# Patient Record
Sex: FEMALE | Race: WHITE | NOT HISPANIC OR LATINO | URBAN - METROPOLITAN AREA
[De-identification: names, ages, dates, MRNs, and addresses within clinical notes are randomized per-mention and may not be internally consistent; named-entity substitution may affect disease eponyms.]

---

## 2017-03-13 ENCOUNTER — OUTPATIENT (OUTPATIENT)
Dept: OUTPATIENT SERVICES | Facility: HOSPITAL | Age: 82
LOS: 1 days | Discharge: HOME | End: 2017-03-13

## 2017-06-27 DIAGNOSIS — E11.9 TYPE 2 DIABETES MELLITUS WITHOUT COMPLICATIONS: ICD-10-CM

## 2017-06-27 DIAGNOSIS — E78.00 PURE HYPERCHOLESTEROLEMIA, UNSPECIFIED: ICD-10-CM

## 2017-07-10 ENCOUNTER — OUTPATIENT (OUTPATIENT)
Dept: OUTPATIENT SERVICES | Facility: HOSPITAL | Age: 82
LOS: 1 days | Discharge: HOME | End: 2017-07-10

## 2017-07-10 DIAGNOSIS — E78.00 PURE HYPERCHOLESTEROLEMIA, UNSPECIFIED: ICD-10-CM

## 2017-07-10 DIAGNOSIS — E11.9 TYPE 2 DIABETES MELLITUS WITHOUT COMPLICATIONS: ICD-10-CM

## 2017-07-10 DIAGNOSIS — R74.9 ABNORMAL SERUM ENZYME LEVEL, UNSPECIFIED: ICD-10-CM

## 2017-11-06 ENCOUNTER — OUTPATIENT (OUTPATIENT)
Dept: OUTPATIENT SERVICES | Facility: HOSPITAL | Age: 82
LOS: 1 days | Discharge: HOME | End: 2017-11-06

## 2017-11-06 DIAGNOSIS — D51.0 VITAMIN B12 DEFICIENCY ANEMIA DUE TO INTRINSIC FACTOR DEFICIENCY: ICD-10-CM

## 2017-11-06 DIAGNOSIS — E78.00 PURE HYPERCHOLESTEROLEMIA, UNSPECIFIED: ICD-10-CM

## 2017-11-06 DIAGNOSIS — I10 ESSENTIAL (PRIMARY) HYPERTENSION: ICD-10-CM

## 2017-11-06 DIAGNOSIS — E11.9 TYPE 2 DIABETES MELLITUS WITHOUT COMPLICATIONS: ICD-10-CM

## 2017-11-20 ENCOUNTER — OUTPATIENT (OUTPATIENT)
Dept: OUTPATIENT SERVICES | Facility: HOSPITAL | Age: 82
LOS: 1 days | Discharge: HOME | End: 2017-11-20

## 2017-11-20 DIAGNOSIS — N63.20 UNSPECIFIED LUMP IN THE LEFT BREAST, UNSPECIFIED QUADRANT: ICD-10-CM

## 2017-11-20 DIAGNOSIS — Z85.3 PERSONAL HISTORY OF MALIGNANT NEOPLASM OF BREAST: ICD-10-CM

## 2017-11-27 ENCOUNTER — OUTPATIENT (OUTPATIENT)
Dept: OUTPATIENT SERVICES | Facility: HOSPITAL | Age: 82
LOS: 1 days | Discharge: HOME | End: 2017-11-27

## 2017-11-29 PROBLEM — Z00.00 ENCOUNTER FOR PREVENTIVE HEALTH EXAMINATION: Status: ACTIVE | Noted: 2017-11-29

## 2017-12-04 ENCOUNTER — APPOINTMENT (OUTPATIENT)
Dept: BREAST CENTER | Facility: CLINIC | Age: 82
End: 2017-12-04
Payer: MEDICARE

## 2017-12-04 ENCOUNTER — OUTPATIENT (OUTPATIENT)
Dept: OUTPATIENT SERVICES | Facility: HOSPITAL | Age: 82
LOS: 1 days | Discharge: HOME | End: 2017-12-04

## 2017-12-04 VITALS
BODY MASS INDEX: 30.73 KG/M2 | DIASTOLIC BLOOD PRESSURE: 90 MMHG | HEART RATE: 81 BPM | WEIGHT: 180 LBS | SYSTOLIC BLOOD PRESSURE: 140 MMHG | HEIGHT: 64 IN | OXYGEN SATURATION: 93 %

## 2017-12-04 DIAGNOSIS — I74.4 EMBOLISM AND THROMBOSIS OF ARTERIES OF EXTREMITIES, UNSPECIFIED: ICD-10-CM

## 2017-12-04 DIAGNOSIS — Z85.3 PERSONAL HISTORY OF MALIGNANT NEOPLASM OF BREAST: ICD-10-CM

## 2017-12-04 DIAGNOSIS — Z86.39 PERSONAL HISTORY OF OTHER ENDOCRINE, NUTRITIONAL AND METABOLIC DISEASE: ICD-10-CM

## 2017-12-04 DIAGNOSIS — Z86.79 PERSONAL HISTORY OF OTHER DISEASES OF THE CIRCULATORY SYSTEM: ICD-10-CM

## 2017-12-04 PROCEDURE — 99203 OFFICE O/P NEW LOW 30 MIN: CPT

## 2017-12-05 PROBLEM — Z86.79 HISTORY OF HYPERTENSION: Status: RESOLVED | Noted: 2017-12-05 | Resolved: 2017-12-05

## 2017-12-05 PROBLEM — Z86.39 HISTORY OF DIABETES MELLITUS: Status: RESOLVED | Noted: 2017-12-05 | Resolved: 2017-12-05

## 2017-12-05 PROBLEM — Z85.3 HISTORY OF MALIGNANT NEOPLASM OF RIGHT BREAST: Status: RESOLVED | Noted: 2017-12-05 | Resolved: 2017-12-05

## 2017-12-05 RX ORDER — ASPIRIN 325 MG/1
TABLET, FILM COATED ORAL
Refills: 0 | Status: ACTIVE | COMMUNITY

## 2017-12-05 RX ORDER — LISINOPRIL 30 MG/1
TABLET ORAL
Refills: 0 | Status: ACTIVE | COMMUNITY

## 2017-12-05 RX ORDER — ATORVASTATIN CALCIUM 80 MG/1
TABLET, FILM COATED ORAL
Refills: 0 | Status: ACTIVE | COMMUNITY

## 2017-12-05 RX ORDER — VITAMIN E 268 MG
CAPSULE ORAL
Refills: 0 | Status: ACTIVE | COMMUNITY

## 2017-12-06 DIAGNOSIS — N63.10 UNSPECIFIED LUMP IN THE RIGHT BREAST, UNSPECIFIED QUADRANT: ICD-10-CM

## 2017-12-06 DIAGNOSIS — D24.2 BENIGN NEOPLASM OF LEFT BREAST: ICD-10-CM

## 2017-12-06 DIAGNOSIS — C50.912 MALIGNANT NEOPLASM OF UNSPECIFIED SITE OF LEFT FEMALE BREAST: ICD-10-CM

## 2017-12-06 DIAGNOSIS — D05.12 INTRADUCTAL CARCINOMA IN SITU OF LEFT BREAST: ICD-10-CM

## 2017-12-06 DIAGNOSIS — Z17.0 ESTROGEN RECEPTOR POSITIVE STATUS [ER+]: ICD-10-CM

## 2017-12-12 LAB
ANION GAP SERPL CALC-SCNC: 11 MEQ/L
BUN SERPL-MCNC: 18 MG/DL
BUN/CREAT SERPL: 13.7 %
CALCIUM SERPL-MCNC: 10.4 MG/DL
CHLORIDE SERPL-SCNC: 102 MEQ/L
CO2 SERPL-SCNC: 26 MEQ/L
CREAT SERPL-MCNC: 1.31 MG/DL
GFR SERPL CREATININE-BSD FRML MDRD: 39
GLUCOSE SERPL-MCNC: 86 MG/DL
POTASSIUM SERPL-SCNC: 4.6 MMOL/L
SODIUM SERPL-SCNC: 139 MEQ/L

## 2017-12-14 ENCOUNTER — OUTPATIENT (OUTPATIENT)
Dept: OUTPATIENT SERVICES | Facility: HOSPITAL | Age: 82
LOS: 1 days | Discharge: HOME | End: 2017-12-14

## 2017-12-14 DIAGNOSIS — C50.412 MALIGNANT NEOPLASM OF UPPER-OUTER QUADRANT OF LEFT FEMALE BREAST: ICD-10-CM

## 2017-12-18 ENCOUNTER — MOBILE ON CALL (OUTPATIENT)
Age: 82
End: 2017-12-18

## 2017-12-22 ENCOUNTER — APPOINTMENT (OUTPATIENT)
Dept: BREAST CENTER | Facility: CLINIC | Age: 82
End: 2017-12-22
Payer: MEDICARE

## 2017-12-22 PROCEDURE — 99213 OFFICE O/P EST LOW 20 MIN: CPT

## 2018-01-05 ENCOUNTER — OUTPATIENT (OUTPATIENT)
Dept: OUTPATIENT SERVICES | Facility: HOSPITAL | Age: 83
LOS: 1 days | Discharge: HOME | End: 2018-01-05

## 2018-01-05 DIAGNOSIS — E11.9 TYPE 2 DIABETES MELLITUS WITHOUT COMPLICATIONS: ICD-10-CM

## 2018-01-05 DIAGNOSIS — Z01.818 ENCOUNTER FOR OTHER PREPROCEDURAL EXAMINATION: ICD-10-CM

## 2018-01-05 DIAGNOSIS — C50.919 MALIGNANT NEOPLASM OF UNSPECIFIED SITE OF UNSPECIFIED FEMALE BREAST: ICD-10-CM

## 2018-01-05 DIAGNOSIS — M19.90 UNSPECIFIED OSTEOARTHRITIS, UNSPECIFIED SITE: ICD-10-CM

## 2018-01-08 DIAGNOSIS — Z85.3 PERSONAL HISTORY OF MALIGNANT NEOPLASM OF BREAST: ICD-10-CM

## 2018-01-08 DIAGNOSIS — I10 ESSENTIAL (PRIMARY) HYPERTENSION: ICD-10-CM

## 2018-01-17 ENCOUNTER — OUTPATIENT (OUTPATIENT)
Dept: OUTPATIENT SERVICES | Facility: HOSPITAL | Age: 83
LOS: 1 days | Discharge: HOME | End: 2018-01-17

## 2018-01-17 DIAGNOSIS — C50.919 MALIGNANT NEOPLASM OF UNSPECIFIED SITE OF UNSPECIFIED FEMALE BREAST: ICD-10-CM

## 2018-01-17 DIAGNOSIS — E11.9 TYPE 2 DIABETES MELLITUS WITHOUT COMPLICATIONS: ICD-10-CM

## 2018-01-17 DIAGNOSIS — M19.90 UNSPECIFIED OSTEOARTHRITIS, UNSPECIFIED SITE: ICD-10-CM

## 2018-01-18 ENCOUNTER — APPOINTMENT (OUTPATIENT)
Dept: BREAST CENTER | Facility: HOSPITAL | Age: 83
End: 2018-01-18
Payer: MEDICARE

## 2018-01-18 ENCOUNTER — INPATIENT (INPATIENT)
Facility: HOSPITAL | Age: 83
LOS: 0 days | Discharge: HOME HEALTH | End: 2018-01-19
Attending: SURGERY

## 2018-01-18 DIAGNOSIS — C50.919 MALIGNANT NEOPLASM OF UNSPECIFIED SITE OF UNSPECIFIED FEMALE BREAST: ICD-10-CM

## 2018-01-18 DIAGNOSIS — E11.9 TYPE 2 DIABETES MELLITUS WITHOUT COMPLICATIONS: ICD-10-CM

## 2018-01-18 DIAGNOSIS — M19.90 UNSPECIFIED OSTEOARTHRITIS, UNSPECIFIED SITE: ICD-10-CM

## 2018-01-18 PROCEDURE — 19303 MAST SIMPLE COMPLETE: CPT | Mod: LT

## 2018-01-18 PROCEDURE — 38792 RA TRACER ID OF SENTINL NODE: CPT | Mod: LT

## 2018-01-18 PROCEDURE — 38500 BIOPSY/REMOVAL LYMPH NODES: CPT | Mod: LT

## 2018-01-23 DIAGNOSIS — Z17.0 ESTROGEN RECEPTOR POSITIVE STATUS [ER+]: ICD-10-CM

## 2018-01-23 DIAGNOSIS — M19.90 UNSPECIFIED OSTEOARTHRITIS, UNSPECIFIED SITE: ICD-10-CM

## 2018-01-23 DIAGNOSIS — E11.9 TYPE 2 DIABETES MELLITUS WITHOUT COMPLICATIONS: ICD-10-CM

## 2018-01-23 DIAGNOSIS — C50.412 MALIGNANT NEOPLASM OF UPPER-OUTER QUADRANT OF LEFT FEMALE BREAST: ICD-10-CM

## 2018-01-23 DIAGNOSIS — E78.00 PURE HYPERCHOLESTEROLEMIA, UNSPECIFIED: ICD-10-CM

## 2018-01-23 DIAGNOSIS — I10 ESSENTIAL (PRIMARY) HYPERTENSION: ICD-10-CM

## 2018-01-26 ENCOUNTER — APPOINTMENT (OUTPATIENT)
Dept: BREAST CENTER | Facility: CLINIC | Age: 83
End: 2018-01-26
Payer: MEDICARE

## 2018-01-26 VITALS — BODY MASS INDEX: 30.73 KG/M2 | WEIGHT: 180 LBS | OXYGEN SATURATION: 96 % | HEART RATE: 80 BPM | HEIGHT: 64 IN

## 2018-01-26 PROCEDURE — 99024 POSTOP FOLLOW-UP VISIT: CPT

## 2018-01-31 ENCOUNTER — APPOINTMENT (OUTPATIENT)
Dept: BREAST CENTER | Facility: CLINIC | Age: 83
End: 2018-01-31
Payer: MEDICARE

## 2018-01-31 VITALS
HEART RATE: 58 BPM | BODY MASS INDEX: 30.39 KG/M2 | DIASTOLIC BLOOD PRESSURE: 78 MMHG | SYSTOLIC BLOOD PRESSURE: 128 MMHG | WEIGHT: 178 LBS | HEIGHT: 64 IN | OXYGEN SATURATION: 97 %

## 2018-01-31 PROCEDURE — 99024 POSTOP FOLLOW-UP VISIT: CPT

## 2018-03-12 ENCOUNTER — APPOINTMENT (OUTPATIENT)
Dept: HEMATOLOGY ONCOLOGY | Facility: CLINIC | Age: 83
End: 2018-03-12

## 2018-03-12 VITALS
TEMPERATURE: 97.4 F | SYSTOLIC BLOOD PRESSURE: 133 MMHG | BODY MASS INDEX: 29.71 KG/M2 | HEIGHT: 64 IN | DIASTOLIC BLOOD PRESSURE: 73 MMHG | RESPIRATION RATE: 14 BRPM | HEART RATE: 70 BPM | WEIGHT: 174 LBS

## 2018-03-12 DIAGNOSIS — Z80.49 FAMILY HISTORY OF MALIGNANT NEOPLASM OF OTHER GENITAL ORGANS: ICD-10-CM

## 2018-03-16 ENCOUNTER — OUTPATIENT (OUTPATIENT)
Dept: OUTPATIENT SERVICES | Facility: HOSPITAL | Age: 83
LOS: 1 days | Discharge: HOME | End: 2018-03-16

## 2018-03-19 DIAGNOSIS — Z78.0 ASYMPTOMATIC MENOPAUSAL STATE: ICD-10-CM

## 2018-03-19 DIAGNOSIS — Z13.820 ENCOUNTER FOR SCREENING FOR OSTEOPOROSIS: ICD-10-CM

## 2018-03-19 DIAGNOSIS — M89.9 DISORDER OF BONE, UNSPECIFIED: ICD-10-CM

## 2018-03-20 ENCOUNTER — OUTPATIENT (OUTPATIENT)
Dept: OUTPATIENT SERVICES | Facility: HOSPITAL | Age: 83
LOS: 1 days | Discharge: HOME | End: 2018-03-20

## 2018-03-20 DIAGNOSIS — E78.00 PURE HYPERCHOLESTEROLEMIA, UNSPECIFIED: ICD-10-CM

## 2018-03-20 DIAGNOSIS — I10 ESSENTIAL (PRIMARY) HYPERTENSION: ICD-10-CM

## 2018-03-20 DIAGNOSIS — E11.9 TYPE 2 DIABETES MELLITUS WITHOUT COMPLICATIONS: ICD-10-CM

## 2018-04-25 ENCOUNTER — APPOINTMENT (OUTPATIENT)
Dept: BREAST CENTER | Facility: CLINIC | Age: 83
End: 2018-04-25
Payer: MEDICARE

## 2018-04-25 VITALS
WEIGHT: 174 LBS | OXYGEN SATURATION: 89 % | HEART RATE: 104 BPM | DIASTOLIC BLOOD PRESSURE: 78 MMHG | SYSTOLIC BLOOD PRESSURE: 110 MMHG | BODY MASS INDEX: 29.71 KG/M2 | HEIGHT: 64 IN

## 2018-04-25 PROCEDURE — 99213 OFFICE O/P EST LOW 20 MIN: CPT

## 2018-06-11 ENCOUNTER — OUTPATIENT (OUTPATIENT)
Dept: OUTPATIENT SERVICES | Facility: HOSPITAL | Age: 83
LOS: 1 days | Discharge: HOME | End: 2018-06-11

## 2018-06-11 ENCOUNTER — APPOINTMENT (OUTPATIENT)
Dept: HEMATOLOGY ONCOLOGY | Facility: CLINIC | Age: 83
End: 2018-06-11

## 2018-06-11 VITALS
BODY MASS INDEX: 30.73 KG/M2 | DIASTOLIC BLOOD PRESSURE: 68 MMHG | SYSTOLIC BLOOD PRESSURE: 112 MMHG | TEMPERATURE: 97.4 F | HEART RATE: 101 BPM | WEIGHT: 180 LBS | HEIGHT: 64 IN

## 2018-06-11 DIAGNOSIS — C50.412 MALIGNANT NEOPLASM OF UPPER-OUTER QUADRANT OF LEFT FEMALE BREAST: ICD-10-CM

## 2018-09-21 ENCOUNTER — OUTPATIENT (OUTPATIENT)
Dept: OUTPATIENT SERVICES | Facility: HOSPITAL | Age: 83
LOS: 1 days | Discharge: HOME | End: 2018-09-21

## 2018-09-21 DIAGNOSIS — D51.0 VITAMIN B12 DEFICIENCY ANEMIA DUE TO INTRINSIC FACTOR DEFICIENCY: ICD-10-CM

## 2018-09-21 DIAGNOSIS — E78.00 PURE HYPERCHOLESTEROLEMIA, UNSPECIFIED: ICD-10-CM

## 2018-09-21 DIAGNOSIS — E11.69 TYPE 2 DIABETES MELLITUS WITH OTHER SPECIFIED COMPLICATION: ICD-10-CM

## 2018-09-21 DIAGNOSIS — I10 ESSENTIAL (PRIMARY) HYPERTENSION: ICD-10-CM

## 2018-10-15 ENCOUNTER — APPOINTMENT (OUTPATIENT)
Dept: HEMATOLOGY ONCOLOGY | Facility: CLINIC | Age: 83
End: 2018-10-15

## 2018-10-15 ENCOUNTER — OUTPATIENT (OUTPATIENT)
Dept: OUTPATIENT SERVICES | Facility: HOSPITAL | Age: 83
LOS: 1 days | Discharge: HOME | End: 2018-10-15

## 2018-10-15 VITALS
HEIGHT: 64 IN | DIASTOLIC BLOOD PRESSURE: 70 MMHG | HEART RATE: 70 BPM | SYSTOLIC BLOOD PRESSURE: 142 MMHG | TEMPERATURE: 97 F | WEIGHT: 180 LBS | BODY MASS INDEX: 30.73 KG/M2

## 2018-10-16 DIAGNOSIS — C50.412 MALIGNANT NEOPLASM OF UPPER-OUTER QUADRANT OF LEFT FEMALE BREAST: ICD-10-CM

## 2018-10-24 ENCOUNTER — APPOINTMENT (OUTPATIENT)
Dept: BREAST CENTER | Facility: CLINIC | Age: 83
End: 2018-10-24
Payer: MEDICARE

## 2018-10-24 VITALS
BODY MASS INDEX: 30.73 KG/M2 | OXYGEN SATURATION: 98 % | DIASTOLIC BLOOD PRESSURE: 70 MMHG | WEIGHT: 180 LBS | SYSTOLIC BLOOD PRESSURE: 112 MMHG | HEIGHT: 64 IN | HEART RATE: 70 BPM

## 2018-10-24 PROCEDURE — 99213 OFFICE O/P EST LOW 20 MIN: CPT

## 2019-02-05 ENCOUNTER — OUTPATIENT (OUTPATIENT)
Dept: OUTPATIENT SERVICES | Facility: HOSPITAL | Age: 84
LOS: 1 days | Discharge: HOME | End: 2019-02-05

## 2019-02-05 DIAGNOSIS — I10 ESSENTIAL (PRIMARY) HYPERTENSION: ICD-10-CM

## 2019-02-05 DIAGNOSIS — E78.00 PURE HYPERCHOLESTEROLEMIA, UNSPECIFIED: ICD-10-CM

## 2019-02-05 DIAGNOSIS — Z00.00 ENCOUNTER FOR GENERAL ADULT MEDICAL EXAMINATION WITHOUT ABNORMAL FINDINGS: ICD-10-CM

## 2019-02-05 DIAGNOSIS — E11.9 TYPE 2 DIABETES MELLITUS WITHOUT COMPLICATIONS: ICD-10-CM

## 2019-03-04 ENCOUNTER — APPOINTMENT (OUTPATIENT)
Dept: HEMATOLOGY ONCOLOGY | Facility: CLINIC | Age: 84
End: 2019-03-04

## 2019-03-12 ENCOUNTER — APPOINTMENT (OUTPATIENT)
Dept: HEMATOLOGY ONCOLOGY | Facility: CLINIC | Age: 84
End: 2019-03-12

## 2019-03-12 ENCOUNTER — OUTPATIENT (OUTPATIENT)
Dept: OUTPATIENT SERVICES | Facility: HOSPITAL | Age: 84
LOS: 1 days | Discharge: HOME | End: 2019-03-12

## 2019-03-12 VITALS
TEMPERATURE: 96.7 F | WEIGHT: 183 LBS | DIASTOLIC BLOOD PRESSURE: 81 MMHG | HEART RATE: 65 BPM | SYSTOLIC BLOOD PRESSURE: 136 MMHG | BODY MASS INDEX: 31.24 KG/M2 | HEIGHT: 64 IN

## 2019-03-14 DIAGNOSIS — Z79.811 LONG TERM (CURRENT) USE OF AROMATASE INHIBITORS: ICD-10-CM

## 2019-03-14 DIAGNOSIS — C50.412 MALIGNANT NEOPLASM OF UPPER-OUTER QUADRANT OF LEFT FEMALE BREAST: ICD-10-CM

## 2019-03-18 NOTE — END OF VISIT
[FreeTextEntry3] : I personally discussed this patient with the physician assistant at the time of the visit, history and physical exam. I agree with the assessment and plan as written unless noted below\par

## 2019-03-18 NOTE — CONSULT LETTER
[Dear  ___] : Dear  [unfilled], [( Thank you for referring [unfilled] for consultation for _____ )] : Thank you for referring [unfilled] for consultation for [unfilled] [Please see my note below.] : Please see my note below. [Consult Closing:] : Thank you very much for allowing me to participate in the care of this patient.  If you have any questions, please do not hesitate to contact me. [Sincerely,] : Sincerely, [DrEsther  ___] : Dr. BURK [FreeTextEntry3] : Michael Matson MD

## 2019-03-18 NOTE — RESULTS/DATA
[FreeTextEntry1] : EXAM: XR BONE DENSITY AXIAL \par \par \par PROCEDURE DATE: 03/16/2018 \par \par \par \par ================================================================= \par  Bone Density Report \par ================================================================= \par \par Age: 82 \par Sex: Female \par Ethnicity: White \par \par ----------------------------------------------------------------- \par \par Indication: osteopenia; \par \par Accession number: [id number] \par \par Bone Density: \par ----------------------------------------------------------------- \par Region BMD T-score Z-score Classification \par ----------------------------------------------------------------- \par AP Spine (L1-L4) 1.037 -0.1 2.7 Normal \par Femoral Neck (Left) 0.848 0.0 2.4 Normal \par Total Hip (Left) 0.899 -0.4 1.8 Normal \par 1/3 Radius (Left) 0.557 -2.3 1.1 \par ----------------------------------------------------------------- \par \par World Health Organization criteria for BMD impression \par classify patients as: \par Normal (T-score at or above -1.0), \par Osteopenia (T-score between -1.0 and -2.5), or \par Osteoporosis (T-score at or below -2.5). \par \par 10-year Fracture Risk: \par ----------------------------------------------------------------- \par FRAX not reported because: \par  All T-scores for Spine Total, Hip Total, Femoral Neck at or \par above -1.0 \par ----------------------------------------------------------------- \par \par \par \par Previous Exams: \par ----------------------------------------------------------------- \par Region Exam Age BMD T-score BMD Change BMD Change \par  Date g/cm2 vs Baseline vs Previous \par ----------------------------------------------------------------- \par AP Spine(L1-L4) \par  03/16/2018 82 1.037 -0.1 6.6%* 6.6%* \par  04/15/2011 75 0.973 -0.7 \par \par Total Hip(Left) \par  03/16/2018 82 0.899 -0.4 -9.4%* -9.4%* \par  04/15/2011 75 0.992 0.4 \par \par Femoral Neck(Left) \par  03/16/2018 82 0.848 0.0 -3.7%* -3.7%* \par  04/15/2011 75 0.881 0.3 \par \par 1/3 Radius(Left) \par  03/16/2018 82 0.557 -2.3 0.0% 0.0% \par  04/15/2011 75 0.557 -2.3 \par ----------------------------------------------------------------- \par *Denotes significance at 95% confidence level, LSC for AP Spine \par = 0.022 g/cm2, LSC for Total Hip = 0.027 g/cm2 \par \par Clinical Information Provided by Patient: \par ----------------------------------------------------------------- \par \par Reason for Referral: 174.4, C50.412 \par Menopause Age: 46 \par  postmenopausal \par ----------------------------------------------------------------- \par \par Impression: The patient has reduced bone density in the 1/3 \par Radius region.The BMD for the Total Hip(Left) decreased, \par changing by -9.4% since the last DXA exam. The BMD for the \par Femoral Neck(Left) decreased, changing by -3.7% since the last \par DXA exam. \par \par Discussion: Note that the 1/3 Radius area is rich in cortical \par bone and sensitive to the effects of parathyroid hormone. An \par appropriate evaluation to rule out primary or secondary \par hyperparathyroidism should be a consideration. The patient \par should follow a healthful lifestyle (good nutrition with \par adequate calcium and vitamin D, and appropriate weight-bearing \par exercise). \par \par Follow-Up: Consider repeating this study in 3 to 4 years to \par reassess this patient's status, or sooner if there is some new \par clinical indication. \par \par Diagnosis: M89.9 Z78.0 Z13.820 \par \par Reported by: Karan Ritter MD, FACP, CCD on 03/16/2018 \par 8:05:00 PM. \par

## 2019-03-18 NOTE — HISTORY OF PRESENT ILLNESS
[Disease: _____________________] : Disease: [unfilled] [T: ___] : T[unfilled] [N: ___] : N[unfilled] [M: ___] : M[unfilled] [AJCC Stage: ____] : AJCC Stage: [unfilled] [de-identified] : 82F with a distant history of right breast cancer, s/p Right mastectomy and chemotherapy in 1990, now with a self palpated LEFT breast cancer, s/p Left mastectomy and sentinel lymph node biopsy on 1/18/18. \par \par She underwent a right mastectomy and chemotherapy for right breast cancer in 1990 without any endocrine therapy or radiation therapy to either breast. She has been disease for free until this year when she palpated a lesion in her inner quadrant of her left breast. Diagnostic mammo revealed a 1.2 cm lesion at the 1-2:00 position, 11 cm from nipple which was biopsied and found to be an invasive carcinoma, intermediate nuclear grade, ER/CT + (Leia 8/8), Her 2 negative. An axillary US revealed 2 abnormal appearing lymph nodes in the left axilla. Bilateral breast MRI was performed to look for extent of disease which demonstrated \par The UOQ of the left breast, posterior 1/3 depth, irregular enhancing mass represents the biopsy proven IDC, measuring 1.1 x 0.8 x 0.6cm. There is additionally non mass enhancement extending approximately 1.0 cm posteriorly from the mass to within 0.6 cm of the of the pectoralis muscle. There is no evidence of enhancement of the pectoralis muscle to indicate involvement. There is a clumped non mass enhancement extending approximately 6.4cm anteriorly from the index carcinoma which extends to within 2.1 cm of the nipple. The non mass enhancement spans 5.3 cm in the craniocaudal dimension.  With multiple morphologically abnormal left axillary lymph nodes without demonstrable fatty hilum measuring up to 1.1 x 0.8 cm. Based on these findings the patient underwent a left breast mastectomy and sentinel lymph node biopsy. \par \par 1/18/18 L mastectomy with sentinel lymph node biopsy [id number] with a tumor size: 1.3 cm, margins negative, no LVI, extensive DCIS, intermediate nuclear grade; focal cLCIS and ALH, 0/3 SLN positive for mets, ER/CT positive (Leia 8/8), Ki67 15, Her 2 negative \par Patient feels well, has no complaints, denies any fevers, chills, shortness of breath, chest pain, N/V/D, abdominal pain, change in appetite or weight loss.  [de-identified] : IDC [de-identified] : 6/11/18\par Patient feels well today, has no complaints\par Has been taking the anastrozole since the last visit with no side effects \par Dexa scan was completed showing osteopenia in the radius \par \par 10/15/18:\par On Arimidex since 03/2018.\par Doing well. No major complaints.\par Denies fever, nausea, vomiting, chest pain, SOB, abdominal pain, bowel and bladder problems.\par DEXA in 03/2018 was normal.. \par \par 3/12/2019\par Patient presents for follow up\par Feels well with no complaints\par Sees Dr. Hutson regularly\par Up to date with flu shot, colonoscopy\par Denies fever, chills, nausea, vomiting, diarrhea [de-identified] :    BREAST, LEFT 1-2 O'CLOCK MASS 1.2 CM, ULTRASOUND GUIDED NEEDLE CORE    BIOPSIES:          -    INVASIVE POORLY DIFFERENTIATED DUCT CARCINOMA AND DUCT              CARCINOMA IN-SITU (DCIS), SOLID AND COMEDO TYPES WITH FOCAL              EXTENSION TO INVOLVE AN INTRADUCTAL PAPILLOMA, INTERMEDIATE              NUCLEAR GRADE.          -    PENDING SPECIAL STUDIES FOR ER/CA PROTEINS, PROLIFERATION INDEX              (Ki-67), AND HER2/WADE ONCOPROTEIN EXPRESSION AND/OR GENE              AMPLIFICATION, WITH RESULTS TO BE REPORTED AS A SEPARATE\par \par      ADDENDUM REPORT    Immunohistochemical studies were performed at [facility], [facility], [address] [place] [address], [facility] (see NOTE).  The results are    as follows:                             % POSITIVE     STAINING INTENSITY  \par   ESTROGEN RECEPTOR        100            STRONG (3+)   \par  PROGESTERONE RECEPTOR    85             STRONG (3+)    \par Ki-67                    15\par \par * * FINAL DIAGNOSIS * *:    A)   BREAST, LEFT AXILLARY SENTINEL LYMPH NODE #1, BIOPSY:         -    ONE BENIGN LYMPH NODE (0/1).          -    NEGATIVE FOR CARCINOMA WITH EVALUATION OF MULTIPLE H&E LEVELS              AND WITH NEGATIVE IMMUNOHISTOCHEMICAL STAINS FOR CYTOKERATINS              (CK AE1/AE3 AND CK 8/18).    B)   BREAST, LEFT AXILLARY SENTINEL LYMPH NODE #2, BIOPSY:         -    TWO BENIGN LYMPH NODES (0/2).         -    NEGATIVE FOR CARCINOMA WITH EVALUATION OF MULTIPLE H&E LEVELS              AND WITH NEGATIVE IMMUNOHISTOCHEMICAL STAINS FOR CYTOKERATINS              (CK AE1/AE3 AND CK 8/18).    Comment: One of these two lymph nodes is largely fatty replaced and did    not appear on the originally reported frozen section slide preparations.    C)   BREAST, LEFT, SIMPLE MASTECTOMY:         -    HEALING PRIOR BIOPSY SITE IN THE UPPER OUTER QUADRANT WITH              INVASIVE MODERATELY DIFFERENTIATED DUCT CARCINOMA, 1.3 CM              (MICROSCOPIC MEASUREMENT), ASSOCIATED WITH MICROCALCIFICATIONS.         -    EXTENSIVE DUCTAL CARCINOMA IN-SITU (DCIS), CRIBRIFORM,              PAPILLARY, AND MICROPAPILLARY TYPES ASSOCIATED WITH FOCAL              COMEDO NECROSIS AND CALCIFICATIONS, INTERMEDIATE NUCLEAR GRADE.         -    NO LYMPHOVASCULAR INVASION IS SEEN.         -    FOCAL LOBULAR CARCINOMA IN-SITU (LCIS), CLASSICAL TYPE, AND              ATYPICAL LOBULAR HYPERPLASIA (ALH).\par \par  -    LARGE DUCT SCLEROSING PAPILLOMA AND A SEPARATE RADIAL              SCLEROSING LESION (RADIAL SCAR).         -    ATROPHIC FATTY BREAST TISSUE WITH PROLIFERATIVE TYPE              FIBROCYSTIC CHANGES ASSOCIATED WITH MICROCALCIFICATIONS.         -    BENIGN SKIN WITH MULTIPLE HYPERKERATOTIC SEBORRHEIC KERATOSES,              NIPPLE, AND DEEP FASCIAL MARGIN INCLUDING SKELETAL MUSCLE.              NEGATIVE FOR CARCINOMA.         -    FIVE BENIGN INTRAMAMMARY LYMPH NODES (O/5), NEAGTIVE FOR              CARCINOMA SUPPORTED BY NEGATIVE IMMUNOHISTOCHEMICAL STAINS FOR              CYTOKERATINS (CK AE1/AE3 AND CK 8/18).         -    FOR HORMONE RECEPTOR AND ONCOPROTEIN EXPRESSION/GENE              AMPLIFICATION STATUS PLEASE SEE THE PATHOLOGY REPORT FROM THE              PRIOR NEEDLE CORE BIOPSY SPECIMEN ([id number]).          -    AJCC SEVENTH EDITION PATHOLOGIC STAGE: pT1c, pN0, pMx.\par

## 2019-03-18 NOTE — ASSESSMENT
[FreeTextEntry1] : In summary this is an  81yo F with Stage 1A ([id number]) , ER/ND +, HER2- Stage 1a Breast Cancer s/p left Mastectomy with SLN biopsy\par \par H/o right breast cancer s/p right mastectomy in 1990. \par \par Plan:\par C/w anastrozole. On Arimidex since 03/2018. Tolerating well. \par DEXA in 03/2018 was normal. Continue Ca and Vit D supplementation\par \par \par RTC 6 months.\par Pt seen and examined with \par

## 2019-03-18 NOTE — PHYSICAL EXAM
[Restricted in physically strenuous activity but ambulatory and able to carry out work of a light or sedentary nature] : Status 1- Restricted in physically strenuous activity but ambulatory and able to carry out work of a light or sedentary nature, e.g., light house work, office work [Normal] : grossly intact [de-identified] : Multiple skin tags  [de-identified] : bilateral mastectomy sites, well healing no axillary lymphadenopathy palpated

## 2019-04-30 ENCOUNTER — OUTPATIENT (OUTPATIENT)
Dept: OUTPATIENT SERVICES | Facility: HOSPITAL | Age: 84
LOS: 1 days | Discharge: HOME | End: 2019-04-30

## 2019-04-30 DIAGNOSIS — N18.3 CHRONIC KIDNEY DISEASE, STAGE 3 (MODERATE): ICD-10-CM

## 2019-04-30 DIAGNOSIS — E11.9 TYPE 2 DIABETES MELLITUS WITHOUT COMPLICATIONS: ICD-10-CM

## 2019-05-08 ENCOUNTER — OUTPATIENT (OUTPATIENT)
Dept: OUTPATIENT SERVICES | Facility: HOSPITAL | Age: 84
LOS: 1 days | Discharge: HOME | End: 2019-05-08
Payer: MEDICARE

## 2019-05-08 PROCEDURE — 76775 US EXAM ABDO BACK WALL LIM: CPT | Mod: 26

## 2019-05-29 ENCOUNTER — APPOINTMENT (OUTPATIENT)
Dept: BREAST CENTER | Facility: CLINIC | Age: 84
End: 2019-05-29
Payer: MEDICARE

## 2019-05-29 VITALS
SYSTOLIC BLOOD PRESSURE: 130 MMHG | BODY MASS INDEX: 31.24 KG/M2 | WEIGHT: 183 LBS | HEIGHT: 64 IN | TEMPERATURE: 98.2 F | DIASTOLIC BLOOD PRESSURE: 80 MMHG

## 2019-05-29 PROCEDURE — 99213 OFFICE O/P EST LOW 20 MIN: CPT

## 2019-05-29 NOTE — HISTORY OF PRESENT ILLNESS
[FreeTextEntry1] : Ms. Moran is an 83F with a distant history of right breast cancer, s/p Right mastectomy and chemotherapy in 1990, now with a self palpated LEFT breast cancer, s/p Left mastectomy and sentinel lymph node biopsy on  1/18/18.  \par \par As review: \par -She underwent a right mastectomy and chemotherapy for right breast cancer in 1990.  \par -She has been disease for free until this year when she palpated a lesion in her inner quadrant of her left breast.  \par -diagnostic imaging revealed a 1.2 cm lesion at the 1-2:00 position, 11 cm from nipple.  --> This was biopsied and found to be an invasive carcinoma, intermediate nuclear grade, ER/SC + (Leia 8/8), Her 2 negative. \par -Additionally axillary US revealed 2 abnormal appearing lymph nodes in the left axilla. \par -She never had endocrine therapy or radiation therapy to either breast.  \par \par Initially she was motivated to undergo breast conservation surgery, so a bilateral breast MRI was performed to look for extent of disease.  Her results were as follows: \par \par 12/14/17 -- bilateral breast MRI\par -R breast: no regions of suspicious enhancement within the mastectomy bed or right chest wall \par -L breast: within UOQ, posterior 1/3 depth, irregular enhancing mass represents the biopsy proven IDC, measuring 1.1 x 0.8 x 0.6cm.  There is additionally nonmass enhancement extending approximately 1.0 cm posteriorly from the mass to within 0.6 cm of the of the pectoralis muscle.  There is no evidence of enhancement of the pectoralis muscle to indicate involvement.  There is a clumped nonmass enhancement extending approximately 6.4cm anteriorly from the index carcinoma which extends to within 2.1 cm of the nipple.  The nonmass enhancement spans 5.3 cm in the craniocaudal dimension. \par -multiple morphologically abnormal left axillary lymph nodes without demonstrable fatty hilum measuring up to 1.1 x 0.8 cm.   \par \par -After discussing the need for further biopsies in the left breast, she made the decision to proceed with a left breast mastectomy and sentinel lymph node biopsy. \par \par 1/18/18 -- L mastectomy with sentinel lymph node biopsy \par -[id number]\par -tumor size: 1.3 cm\par -margins negative\par -no LVI\par -extensive DCIS, intermediate nuclear grade; focal cLCIS and ALH\par -0/3 SLN positive for mets\par -ER/SC positive (Leia 8/8), Ki67 15, Her 2 negative  \par \par INTERVAL HISTORY: \par Kellen returns today for a 6 month follow up visit s/p L mastectomy with sentinel lymph node biopsy on 1/18/18.  She denies any pain at the surgical site.  She denies palpating any abnormal masses in either chest wall.  She denies any arm swelling or heaviness.  \par \par She is on arimidex and is tolerating it well.  She denies any bony/muscle/joint pain, and is not having any hot flashes.

## 2019-05-29 NOTE — REVIEW OF SYSTEMS
[As Noted in HPI] : as noted in HPI [Negative] : Psychiatric [Fever] : no fever [Chills] : no chills [Feeling Poorly] : not feeling poorly [Feeling Tired] : not feeling tired [Recent Weight Loss (___ Lbs)] : no recent weight loss [Recent Weight Gain (___ Lbs)] : no recent weight gain [Abn Vaginal Bleeding] : no unexplained vaginal bleeding [Arthralgias] : no arthralgias [Joint Pain] : no joint pain [Skin Lesions] : no skin lesions [Skin Wound] : no skin wound [Breast Pain] : no breast pain [Hot Flashes] : no hot flashes [Breast Lump] : no breast lump

## 2019-05-29 NOTE — PAST MEDICAL HISTORY
[Menarche Age ____] : age at menarche was [unfilled] [Total Preg ___] : G[unfilled] [Menopause Age____] : age at menopause was [unfilled] [Live Births ___] : P[unfilled]  [Age At Live Birth ___] : Age at live birth: [unfilled] [FreeTextEntry5] : none [FreeTextEntry7] : none [FreeTextEntry6] : none [FreeTextEntry8] : none

## 2019-05-29 NOTE — PHYSICAL EXAM
[EOMI] : extra ocular movement intact [Normocephalic] : normocephalic [Atraumatic] : atraumatic [No dominant masses] : no dominant masses in right breast  [No Supraclavicular Adenopathy] : no supraclavicular adenopathy [No Cervical Adenopathy] : no cervical adenopathy [No Axillary Lymphadenopathy] : no left axillary lymphadenopathy [No dominant masses] : no dominant masses left breast [Not Tender] : non-tender [Soft] : abdomen soft [No Ulceration] : no ulceration [No Edema] : no edema [No Rashes] : no rashes [Examined in the supine and seated position] : examined in the supine and seated position [de-identified] : bilateral surgical incisions are well healed, no discrete masses palpated in either breast.  She does have many skin moles on her chest wall -- exam unchanged

## 2019-05-29 NOTE — ASSESSMENT
[FreeTextEntry1] : Ms. Moran is an 83F with a stage 1, [id number], ER/WI positive (ashley 8/8) Her 2 neg left breast cancer, s/p L mastectomy with sentinel lymph node biopsy on 1/18/18. \par \par -no chemo indicated for L breast cancer\par -no RT indicated on L\par -on AI \par \par She is well healed from her left mastectomy and SLN Bx.  I do not palpate any nodularities in either chest wall.  She will be due for a repeat CBE in 6 months.  She is tolerating the AI without any issues. \par \par As review:\par Ms. Moran underwent a left mastectomy with SLN Bx which yielded a 1.3 cm tumor with extensive DCIS and no evidence of metastasis to her sentinel lymph nodes.  This tumor was ER/WI positive and Her 2 negative. Her margins were negative. \par \par She will not need post mastectomy radiation therapy for a stage 1A disease with negative margins so a radiation oncology consult will not be made at this time. \par \par In regards to systemic therapy, an oncotype was sent on her tumor, however there was insufficient carcinoma on the slide submitted.  She was seen by medical oncology, and was recommended to undergo 5 years of an aromatase inhibitor, which she is tolerating well.  \par \par She is a candidate for genetic evaluation as she has had bilateral breast cancer.  I have offered to perform the testing today, however she is not interested in getting tested.  \par \par I will have her follow up in six months for a clinical breast exam.  \par All of her questions were answered. She knows to call with any further questions or concerns.

## 2019-06-04 ENCOUNTER — OUTPATIENT (OUTPATIENT)
Dept: OUTPATIENT SERVICES | Facility: HOSPITAL | Age: 84
LOS: 1 days | Discharge: HOME | End: 2019-06-04

## 2019-06-04 DIAGNOSIS — I10 ESSENTIAL (PRIMARY) HYPERTENSION: ICD-10-CM

## 2019-06-04 DIAGNOSIS — E11.65 TYPE 2 DIABETES MELLITUS WITH HYPERGLYCEMIA: ICD-10-CM

## 2019-06-04 DIAGNOSIS — E78.00 PURE HYPERCHOLESTEROLEMIA, UNSPECIFIED: ICD-10-CM

## 2019-09-03 ENCOUNTER — OUTPATIENT (OUTPATIENT)
Dept: OUTPATIENT SERVICES | Facility: HOSPITAL | Age: 84
LOS: 1 days | Discharge: HOME | End: 2019-09-03

## 2019-09-03 ENCOUNTER — APPOINTMENT (OUTPATIENT)
Dept: HEMATOLOGY ONCOLOGY | Facility: CLINIC | Age: 84
End: 2019-09-03
Payer: MEDICARE

## 2019-09-03 VITALS
RESPIRATION RATE: 14 BRPM | DIASTOLIC BLOOD PRESSURE: 71 MMHG | SYSTOLIC BLOOD PRESSURE: 105 MMHG | HEIGHT: 62 IN | HEART RATE: 91 BPM | TEMPERATURE: 97.2 F | BODY MASS INDEX: 32.76 KG/M2 | WEIGHT: 178 LBS

## 2019-09-03 PROCEDURE — 99214 OFFICE O/P EST MOD 30 MIN: CPT

## 2019-09-03 NOTE — RESULTS/DATA
[FreeTextEntry1] : EXAM: XR BONE DENSITY AXIAL \par \par \par PROCEDURE DATE: 03/16/2018 \par \par \par \par ================================================================= \par  Bone Density Report \par ================================================================= \par \par Age: 82 \par Sex: Female \par Ethnicity: White \par \par ----------------------------------------------------------------- \par \par Indication: osteopenia; \par \par Accession number: [id number] \par \par Bone Density: \par ----------------------------------------------------------------- \par Region BMD T-score Z-score Classification \par ----------------------------------------------------------------- \par AP Spine (L1-L4) 1.037 -0.1 2.7 Normal \par Femoral Neck (Left) 0.848 0.0 2.4 Normal \par Total Hip (Left) 0.899 -0.4 1.8 Normal \par 1/3 Radius (Left) 0.557 -2.3 1.1 \par ----------------------------------------------------------------- \par \par World Health Organization criteria for BMD impression \par classify patients as: \par Normal (T-score at or above -1.0), \par Osteopenia (T-score between -1.0 and -2.5), or \par Osteoporosis (T-score at or below -2.5). \par \par 10-year Fracture Risk: \par ----------------------------------------------------------------- \par FRAX not reported because: \par  All T-scores for Spine Total, Hip Total, Femoral Neck at or \par above -1.0 \par ----------------------------------------------------------------- \par \par \par \par Previous Exams: \par ----------------------------------------------------------------- \par Region Exam Age BMD T-score BMD Change BMD Change \par  Date g/cm2 vs Baseline vs Previous \par ----------------------------------------------------------------- \par AP Spine(L1-L4) \par  03/16/2018 82 1.037 -0.1 6.6%* 6.6%* \par  04/15/2011 75 0.973 -0.7 \par \par Total Hip(Left) \par  03/16/2018 82 0.899 -0.4 -9.4%* -9.4%* \par  04/15/2011 75 0.992 0.4 \par \par Femoral Neck(Left) \par  03/16/2018 82 0.848 0.0 -3.7%* -3.7%* \par  04/15/2011 75 0.881 0.3 \par \par 1/3 Radius(Left) \par  03/16/2018 82 0.557 -2.3 0.0% 0.0% \par  04/15/2011 75 0.557 -2.3 \par ----------------------------------------------------------------- \par *Denotes significance at 95% confidence level, LSC for AP Spine \par = 0.022 g/cm2, LSC for Total Hip = 0.027 g/cm2 \par \par Clinical Information Provided by Patient: \par ----------------------------------------------------------------- \par \par Reason for Referral: 174.4, C50.412 \par Menopause Age: 46 \par  postmenopausal \par ----------------------------------------------------------------- \par \par Impression: The patient has reduced bone density in the 1/3 \par Radius region.The BMD for the Total Hip(Left) decreased, \par changing by -9.4% since the last DXA exam. The BMD for the \par Femoral Neck(Left) decreased, changing by -3.7% since the last \par DXA exam. \par \par Discussion: Note that the 1/3 Radius area is rich in cortical \par bone and sensitive to the effects of parathyroid hormone. An \par appropriate evaluation to rule out primary or secondary \par hyperparathyroidism should be a consideration. The patient \par should follow a healthful lifestyle (good nutrition with \par adequate calcium and vitamin D, and appropriate weight-bearing \par exercise). \par \par Follow-Up: Consider repeating this study in 3 to 4 years to \par reassess this patient's status, or sooner if there is some new \par clinical indication. \par \par Diagnosis: M89.9 Z78.0 Z13.820 \par \par Reported by: Karan Ritter MD, FACP, CCD on 03/16/2018 \par 8:05:00 PM. \par

## 2019-09-03 NOTE — CONSULT LETTER
[Dear  ___] : Dear  [unfilled], [( Thank you for referring [unfilled] for consultation for _____ )] : Thank you for referring [unfilled] for consultation for [unfilled] [Consult Closing:] : Thank you very much for allowing me to participate in the care of this patient.  If you have any questions, please do not hesitate to contact me. [Please see my note below.] : Please see my note below. [Sincerely,] : Sincerely, [DrEsther  ___] : Dr. BURK [FreeTextEntry3] : Michael Matson MD

## 2019-09-03 NOTE — ASSESSMENT
[FreeTextEntry1] : In summary this is an  83yo F with Stage 1A ([id number]) , ER/[place] +, HER2- Stage 1a Breast Cancer s/p left Mastectomy with SLN biopsy\par \par H/o right breast cancer s/p right mastectomy in 1990. \par \par Plan:\par Continue Anastrozole. On Arimidex since 03/2018. Tolerating well. E-refilled today.\par DEXA in 03/2018 was normal. Continue Ca and Vit D supplementation\par RTC 6 months.\par \par Case was seen and discussed with Dr. Matson who agreed with the assessment and plan.\par \par

## 2019-09-03 NOTE — HISTORY OF PRESENT ILLNESS
[T: ___] : T[unfilled] [Disease: _____________________] : Disease: [unfilled] [M: ___] : M[unfilled] [N: ___] : N[unfilled] [AJCC Stage: ____] : AJCC Stage: [unfilled] [de-identified] : 82F with a distant history of right breast cancer, s/p Right mastectomy and chemotherapy in 1990, now with a self palpated LEFT breast cancer, s/p Left mastectomy and sentinel lymph node biopsy on 1/18/18. \par \par She underwent a right mastectomy and chemotherapy for right breast cancer in 1990 without any endocrine therapy or radiation therapy to either breast. She has been disease for free until this year when she palpated a lesion in her inner quadrant of her left breast. Diagnostic mammo revealed a 1.2 cm lesion at the 1-2:00 position, 11 cm from nipple which was biopsied and found to be an invasive carcinoma, intermediate nuclear grade, ER/KY + (Leia 8/8), Her 2 negative. An axillary US revealed 2 abnormal appearing lymph nodes in the left axilla. Bilateral breast MRI was performed to look for extent of disease which demonstrated \par The UOQ of the left breast, posterior 1/3 depth, irregular enhancing mass represents the biopsy proven IDC, measuring 1.1 x 0.8 x 0.6cm. There is additionally non mass enhancement extending approximately 1.0 cm posteriorly from the mass to within 0.6 cm of the of the pectoralis muscle. There is no evidence of enhancement of the pectoralis muscle to indicate involvement. There is a clumped non mass enhancement extending approximately 6.4cm anteriorly from the index carcinoma which extends to within 2.1 cm of the nipple. The non mass enhancement spans 5.3 cm in the craniocaudal dimension.  With multiple morphologically abnormal left axillary lymph nodes without demonstrable fatty hilum measuring up to 1.1 x 0.8 cm. Based on these findings the patient underwent a left breast mastectomy and sentinel lymph node biopsy. \par \par 1/18/18 L mastectomy with sentinel lymph node biopsy [id number] with a tumor size: 1.3 cm, margins negative, no LVI, extensive DCIS, intermediate nuclear grade; focal cLCIS and ALH, 0/3 SLN positive for mets, ER/KY positive (Leia 8/8), Ki67 15, Her 2 negative \par Patient feels well, has no complaints, denies any fevers, chills, shortness of breath, chest pain, N/V/D, abdominal pain, change in appetite or weight loss.  [de-identified] : IDC [de-identified] :    BREAST, LEFT 1-2 O'CLOCK MASS 1.2 CM, ULTRASOUND GUIDED NEEDLE CORE    BIOPSIES:          -    INVASIVE POORLY DIFFERENTIATED DUCT CARCINOMA AND DUCT              CARCINOMA IN-SITU (DCIS), SOLID AND COMEDO TYPES WITH FOCAL              EXTENSION TO INVOLVE AN INTRADUCTAL PAPILLOMA, INTERMEDIATE              NUCLEAR GRADE.          -    PENDING SPECIAL STUDIES FOR ER/AK PROTEINS, PROLIFERATION INDEX              (Ki-67), AND HER2/WADE ONCOPROTEIN EXPRESSION AND/OR GENE              AMPLIFICATION, WITH RESULTS TO BE REPORTED AS A SEPARATE\par \par      ADDENDUM REPORT    Immunohistochemical studies were performed at [facility], [facility], [address] [place] [address], [facility] (see NOTE).  The results are    as follows:                             % POSITIVE     STAINING INTENSITY  \par   ESTROGEN RECEPTOR        100            STRONG (3+)   \par  PROGESTERONE RECEPTOR    85             STRONG (3+)    \par Ki-67                    15\par \par * * FINAL DIAGNOSIS * *:    A)   BREAST, LEFT AXILLARY SENTINEL LYMPH NODE #1, BIOPSY:         -    ONE BENIGN LYMPH NODE (0/1).          -    NEGATIVE FOR CARCINOMA WITH EVALUATION OF MULTIPLE H&E LEVELS              AND WITH NEGATIVE IMMUNOHISTOCHEMICAL STAINS FOR CYTOKERATINS              (CK AE1/AE3 AND CK 8/18).    B)   BREAST, LEFT AXILLARY SENTINEL LYMPH NODE #2, BIOPSY:         -    TWO BENIGN LYMPH NODES (0/2).         -    NEGATIVE FOR CARCINOMA WITH EVALUATION OF MULTIPLE H&E LEVELS              AND WITH NEGATIVE IMMUNOHISTOCHEMICAL STAINS FOR CYTOKERATINS              (CK AE1/AE3 AND CK 8/18).    Comment: One of these two lymph nodes is largely fatty replaced and did    not appear on the originally reported frozen section slide preparations.    C)   BREAST, LEFT, SIMPLE MASTECTOMY:         -    HEALING PRIOR BIOPSY SITE IN THE UPPER OUTER QUADRANT WITH              INVASIVE MODERATELY DIFFERENTIATED DUCT CARCINOMA, 1.3 CM              (MICROSCOPIC MEASUREMENT), ASSOCIATED WITH MICROCALCIFICATIONS.         -    EXTENSIVE DUCTAL CARCINOMA IN-SITU (DCIS), CRIBRIFORM,              PAPILLARY, AND MICROPAPILLARY TYPES ASSOCIATED WITH FOCAL              COMEDO NECROSIS AND CALCIFICATIONS, INTERMEDIATE NUCLEAR GRADE.         -    NO LYMPHOVASCULAR INVASION IS SEEN.         -    FOCAL LOBULAR CARCINOMA IN-SITU (LCIS), CLASSICAL TYPE, AND              ATYPICAL LOBULAR HYPERPLASIA (ALH).\par \par  -    LARGE DUCT SCLEROSING PAPILLOMA AND A SEPARATE RADIAL              SCLEROSING LESION (RADIAL SCAR).         -    ATROPHIC FATTY BREAST TISSUE WITH PROLIFERATIVE TYPE              FIBROCYSTIC CHANGES ASSOCIATED WITH MICROCALCIFICATIONS.         -    BENIGN SKIN WITH MULTIPLE HYPERKERATOTIC SEBORRHEIC KERATOSES,              NIPPLE, AND DEEP FASCIAL MARGIN INCLUDING SKELETAL MUSCLE.              NEGATIVE FOR CARCINOMA.         -    FIVE BENIGN INTRAMAMMARY LYMPH NODES (O/5), NEAGTIVE FOR              CARCINOMA SUPPORTED BY NEGATIVE IMMUNOHISTOCHEMICAL STAINS FOR              CYTOKERATINS (CK AE1/AE3 AND CK 8/18).         -    FOR HORMONE RECEPTOR AND ONCOPROTEIN EXPRESSION/GENE              AMPLIFICATION STATUS PLEASE SEE THE PATHOLOGY REPORT FROM THE              PRIOR NEEDLE CORE BIOPSY SPECIMEN ([id number]).          -    AJCC SEVENTH EDITION PATHOLOGIC STAGE: pT1c, pN0, pMx.\par  [de-identified] : 6/11/18\par Patient feels well today, has no complaints\par Has been taking the anastrozole since the last visit with no side effects \par Dexa scan was completed showing osteopenia in the radius \par \par 10/15/18:\par On Arimidex since 03/2018.\par Doing well. No major complaints.\par Denies fever, nausea, vomiting, chest pain, SOB, abdominal pain, bowel and bladder problems.\par DEXA in 03/2018 was normal.. \par \par 3/12/2019\par Patient presents for follow up\par Feels well with no complaints\par Sees Dr. Hutson regularly\par Up to date with flu shot, colonoscopy\par Denies fever, chills, nausea, vomiting, diarrhea\par \par 9/3/19\par Patient is here today for follow up visit. She offers no new complaints.  She is taking Anastrozole since 3/2018.  Last bone density was done 3/2018 normal.  She is taking calcium and vitamin D. Denies fever, chills, nausea, vomiting, diarrhea.

## 2019-09-03 NOTE — PHYSICAL EXAM
[Restricted in physically strenuous activity but ambulatory and able to carry out work of a light or sedentary nature] : Status 1- Restricted in physically strenuous activity but ambulatory and able to carry out work of a light or sedentary nature, e.g., light house work, office work [Normal] : grossly intact [de-identified] : bilateral mastectomy sites, well healing no axillary lymphadenopathy palpated [de-identified] : Multiple skin tags

## 2019-09-06 DIAGNOSIS — C50.412 MALIGNANT NEOPLASM OF UPPER-OUTER QUADRANT OF LEFT FEMALE BREAST: ICD-10-CM

## 2019-10-22 ENCOUNTER — OUTPATIENT (OUTPATIENT)
Dept: OUTPATIENT SERVICES | Facility: HOSPITAL | Age: 84
LOS: 1 days | Discharge: HOME | End: 2019-10-22

## 2019-10-22 DIAGNOSIS — E78.00 PURE HYPERCHOLESTEROLEMIA, UNSPECIFIED: ICD-10-CM

## 2019-10-22 DIAGNOSIS — E11.9 TYPE 2 DIABETES MELLITUS WITHOUT COMPLICATIONS: ICD-10-CM

## 2019-10-22 DIAGNOSIS — I10 ESSENTIAL (PRIMARY) HYPERTENSION: ICD-10-CM

## 2019-10-22 DIAGNOSIS — D51.0 VITAMIN B12 DEFICIENCY ANEMIA DUE TO INTRINSIC FACTOR DEFICIENCY: ICD-10-CM

## 2019-12-27 ENCOUNTER — APPOINTMENT (OUTPATIENT)
Dept: BREAST CENTER | Facility: CLINIC | Age: 84
End: 2019-12-27
Payer: MEDICARE

## 2019-12-27 VITALS
BODY MASS INDEX: 32.76 KG/M2 | WEIGHT: 178 LBS | TEMPERATURE: 98.2 F | HEIGHT: 62 IN | DIASTOLIC BLOOD PRESSURE: 78 MMHG | SYSTOLIC BLOOD PRESSURE: 126 MMHG

## 2019-12-27 PROCEDURE — 99213 OFFICE O/P EST LOW 20 MIN: CPT

## 2019-12-27 NOTE — REVIEW OF SYSTEMS
[As Noted in HPI] : as noted in HPI [Negative] : Psychiatric [Fever] : no fever [Chills] : no chills [Feeling Poorly] : not feeling poorly [Feeling Tired] : not feeling tired [Recent Weight Gain (___ Lbs)] : no recent weight gain [Recent Weight Loss (___ Lbs)] : no recent weight loss [Abn Vaginal Bleeding] : no unexplained vaginal bleeding [Arthralgias] : no arthralgias [Joint Pain] : no joint pain [Skin Lesions] : no skin lesions [Skin Wound] : no skin wound [Breast Pain] : no breast pain [Breast Lump] : no breast lump [Hot Flashes] : no hot flashes

## 2019-12-27 NOTE — PAST MEDICAL HISTORY
[Menarche Age ____] : age at menarche was [unfilled] [Menopause Age____] : age at menopause was [unfilled] [Total Preg ___] : G[unfilled] [Live Births ___] : P[unfilled]  [Age At Live Birth ___] : Age at live birth: [unfilled] [FreeTextEntry5] : none [FreeTextEntry7] : none [FreeTextEntry6] : none [FreeTextEntry8] : none

## 2019-12-27 NOTE — ASSESSMENT
[FreeTextEntry1] : Ms. Moran is an 84F with a stage 1, [id number], ER/MI positive (ashley 8/8) Her 2 neg left breast cancer, s/p L mastectomy with sentinel lymph node biopsy on 1/18/18. \par \par -no chemo indicated for L breast cancer\par -no RT indicated on L\par -on AI since 1/2018\par \par She is well healed from her left mastectomy and SLN Bx.  I do not palpate any nodularities in either chest wall.  She will be due for a repeat CBE in 6 months.  She is tolerating the AI without any issues. \par \par As review:\par Ms. Moran underwent a left mastectomy with SLN Bx which yielded a 1.3 cm tumor with extensive DCIS and no evidence of metastasis to her sentinel lymph nodes.  This tumor was ER/MI positive and Her 2 negative. Her margins were negative. \par \par She will not need post mastectomy radiation therapy for a stage 1A disease with negative margins so a radiation oncology consult will not be made at this time. \par \par In regards to systemic therapy, an oncotype was sent on her tumor, however there was insufficient carcinoma on the slide submitted.  She was seen by medical oncology, and was recommended to undergo 5 years of an aromatase inhibitor, which she is tolerating well.  \par \par She is a candidate for genetic evaluation as she has had bilateral breast cancer.  I have offered to perform the testing today, however she is not interested in getting tested.  \par \par I will have her follow up in six months for a clinical breast exam.  \par All of her questions were answered. She knows to call with any further questions or concerns.

## 2019-12-27 NOTE — PHYSICAL EXAM
[Normocephalic] : normocephalic [Atraumatic] : atraumatic [EOMI] : extra ocular movement intact [No Supraclavicular Adenopathy] : no supraclavicular adenopathy [No Cervical Adenopathy] : no cervical adenopathy [No dominant masses] : no dominant masses in right breast  [Examined in the supine and seated position] : examined in the supine and seated position [No dominant masses] : no dominant masses left breast [No Axillary Lymphadenopathy] : no left axillary lymphadenopathy [Soft] : abdomen soft [Not Tender] : non-tender [No Rashes] : no rashes [No Edema] : no edema [No Ulceration] : no ulceration [de-identified] : bilateral surgical incisions are well healed, no discrete masses palpated in either breast.  She does have many skin moles on her chest wall -- exam unchanged

## 2019-12-27 NOTE — HISTORY OF PRESENT ILLNESS
[FreeTextEntry1] : Ms. Moran is an 84F with a distant history of right breast cancer, s/p Right mastectomy and chemotherapy in 1990, now with a self palpated LEFT breast cancer, s/p Left mastectomy and sentinel lymph node biopsy on  1/18/18.  \par \par As review: \par -She underwent a right mastectomy and chemotherapy for right breast cancer in 1990.  \par -She has been disease for free until this year when she palpated a lesion in her inner quadrant of her left breast.  \par -diagnostic imaging revealed a 1.2 cm lesion at the 1-2:00 position, 11 cm from nipple.  --> This was biopsied and found to be an invasive carcinoma, intermediate nuclear grade, ER/NV + (Leia 8/8), Her 2 negative. \par -Additionally axillary US revealed 2 abnormal appearing lymph nodes in the left axilla. \par -She never had endocrine therapy or radiation therapy to either breast.  \par \par Initially she was motivated to undergo breast conservation surgery, so a bilateral breast MRI was performed to look for extent of disease.  Her results were as follows: \par \par 12/14/17 -- bilateral breast MRI\par -R breast: no regions of suspicious enhancement within the mastectomy bed or right chest wall \par -L breast: within UOQ, posterior 1/3 depth, irregular enhancing mass represents the biopsy proven IDC, measuring 1.1 x 0.8 x 0.6cm.  There is additionally nonmass enhancement extending approximately 1.0 cm posteriorly from the mass to within 0.6 cm of the of the pectoralis muscle.  There is no evidence of enhancement of the pectoralis muscle to indicate involvement.  There is a clumped nonmass enhancement extending approximately 6.4cm anteriorly from the index carcinoma which extends to within 2.1 cm of the nipple.  The nonmass enhancement spans 5.3 cm in the craniocaudal dimension. \par -multiple morphologically abnormal left axillary lymph nodes without demonstrable fatty hilum measuring up to 1.1 x 0.8 cm.   \par \par -After discussing the need for further biopsies in the left breast, she made the decision to proceed with a left breast mastectomy and sentinel lymph node biopsy. \par \par 1/18/18 -- L mastectomy with sentinel lymph node biopsy \par -[id number]\par -tumor size: 1.3 cm\par -margins negative\par -no LVI\par -extensive DCIS, intermediate nuclear grade; focal cLCIS and ALH\par -0/3 SLN positive for mets\par -ER/NV positive (Leia 8/8), Ki67 15, Her 2 negative  \par \par INTERVAL HISTORY: \par Kellen returns today for a 6 month follow up visit s/p L mastectomy with sentinel lymph node biopsy on 1/18/18.  She denies any pain at the surgical site.  She denies palpating any abnormal masses in either chest wall.  She denies any arm swelling or heaviness.  \par \par She is on arimidex since 1/2018 and is tolerating it well.  She denies any bony/muscle/joint pain, and is not having any hot flashes.

## 2020-03-03 ENCOUNTER — OUTPATIENT (OUTPATIENT)
Dept: OUTPATIENT SERVICES | Facility: HOSPITAL | Age: 85
LOS: 1 days | Discharge: HOME | End: 2020-03-03

## 2020-03-03 ENCOUNTER — APPOINTMENT (OUTPATIENT)
Dept: HEMATOLOGY ONCOLOGY | Facility: CLINIC | Age: 85
End: 2020-03-03
Payer: MEDICARE

## 2020-03-03 VITALS
TEMPERATURE: 98.7 F | HEIGHT: 62 IN | SYSTOLIC BLOOD PRESSURE: 178 MMHG | WEIGHT: 178 LBS | BODY MASS INDEX: 32.76 KG/M2 | HEART RATE: 76 BPM | DIASTOLIC BLOOD PRESSURE: 84 MMHG

## 2020-03-03 PROCEDURE — 99214 OFFICE O/P EST MOD 30 MIN: CPT

## 2020-03-03 NOTE — PHYSICAL EXAM
[Restricted in physically strenuous activity but ambulatory and able to carry out work of a light or sedentary nature] : Status 1- Restricted in physically strenuous activity but ambulatory and able to carry out work of a light or sedentary nature, e.g., light house work, office work [Normal] : grossly intact [de-identified] : bilateral mastectomy sites, well healing no axillary lymphadenopathy palpated [de-identified] : Multiple skin tags, middle of chest erythematous patches x 1 week.

## 2020-03-03 NOTE — HISTORY OF PRESENT ILLNESS
[Disease: _____________________] : Disease: [unfilled] [T: ___] : T[unfilled] [N: ___] : N[unfilled] [M: ___] : M[unfilled] [AJCC Stage: ____] : AJCC Stage: [unfilled] [de-identified] : 82F with a distant history of right breast cancer, s/p Right mastectomy and chemotherapy in 1990, now with a self palpated LEFT breast cancer, s/p Left mastectomy and sentinel lymph node biopsy on 1/18/18. \par \par She underwent a right mastectomy and chemotherapy for right breast cancer in 1990 without any endocrine therapy or radiation therapy to either breast. She has been disease for free until this year when she palpated a lesion in her inner quadrant of her left breast. Diagnostic mammo revealed a 1.2 cm lesion at the 1-2:00 position, 11 cm from nipple which was biopsied and found to be an invasive carcinoma, intermediate nuclear grade, ER/CT + (Leia 8/8), Her 2 negative. An axillary US revealed 2 abnormal appearing lymph nodes in the left axilla. Bilateral breast MRI was performed to look for extent of disease which demonstrated \par The UOQ of the left breast, posterior 1/3 depth, irregular enhancing mass represents the biopsy proven IDC, measuring 1.1 x 0.8 x 0.6cm. There is additionally non mass enhancement extending approximately 1.0 cm posteriorly from the mass to within 0.6 cm of the of the pectoralis muscle. There is no evidence of enhancement of the pectoralis muscle to indicate involvement. There is a clumped non mass enhancement extending approximately 6.4cm anteriorly from the index carcinoma which extends to within 2.1 cm of the nipple. The non mass enhancement spans 5.3 cm in the craniocaudal dimension.  With multiple morphologically abnormal left axillary lymph nodes without demonstrable fatty hilum measuring up to 1.1 x 0.8 cm. Based on these findings the patient underwent a left breast mastectomy and sentinel lymph node biopsy. \par \par 1/18/18 L mastectomy with sentinel lymph node biopsy [id number] with a tumor size: 1.3 cm, margins negative, no LVI, extensive DCIS, intermediate nuclear grade; focal cLCIS and ALH, 0/3 SLN positive for mets, ER/CT positive (Leia 8/8), Ki67 15, Her 2 negative \par Patient feels well, has no complaints, denies any fevers, chills, shortness of breath, chest pain, N/V/D, abdominal pain, change in appetite or weight loss.  [de-identified] : IDC [de-identified] :    BREAST, LEFT 1-2 O'CLOCK MASS 1.2 CM, ULTRASOUND GUIDED NEEDLE CORE    BIOPSIES:          -    INVASIVE POORLY DIFFERENTIATED DUCT CARCINOMA AND DUCT              CARCINOMA IN-SITU (DCIS), SOLID AND COMEDO TYPES WITH FOCAL              EXTENSION TO INVOLVE AN INTRADUCTAL PAPILLOMA, INTERMEDIATE              NUCLEAR GRADE.          -    PENDING SPECIAL STUDIES FOR ER/WI PROTEINS, PROLIFERATION INDEX              (Ki-67), AND HER2/WADE ONCOPROTEIN EXPRESSION AND/OR GENE              AMPLIFICATION, WITH RESULTS TO BE REPORTED AS A SEPARATE\par \par      ADDENDUM REPORT    Immunohistochemical studies were performed at [facility], [facility], [address] [place] [address], [facility] (see NOTE).  The results are    as follows:                             % POSITIVE     STAINING INTENSITY  \par   ESTROGEN RECEPTOR        100            STRONG (3+)   \par  PROGESTERONE RECEPTOR    85             STRONG (3+)    \par Ki-67                    15\par \par * * FINAL DIAGNOSIS * *:    A)   BREAST, LEFT AXILLARY SENTINEL LYMPH NODE #1, BIOPSY:         -    ONE BENIGN LYMPH NODE (0/1).          -    NEGATIVE FOR CARCINOMA WITH EVALUATION OF MULTIPLE H&E LEVELS              AND WITH NEGATIVE IMMUNOHISTOCHEMICAL STAINS FOR CYTOKERATINS              (CK AE1/AE3 AND CK 8/18).    B)   BREAST, LEFT AXILLARY SENTINEL LYMPH NODE #2, BIOPSY:         -    TWO BENIGN LYMPH NODES (0/2).         -    NEGATIVE FOR CARCINOMA WITH EVALUATION OF MULTIPLE H&E LEVELS              AND WITH NEGATIVE IMMUNOHISTOCHEMICAL STAINS FOR CYTOKERATINS              (CK AE1/AE3 AND CK 8/18).    Comment: One of these two lymph nodes is largely fatty replaced and did    not appear on the originally reported frozen section slide preparations.    C)   BREAST, LEFT, SIMPLE MASTECTOMY:         -    HEALING PRIOR BIOPSY SITE IN THE UPPER OUTER QUADRANT WITH              INVASIVE MODERATELY DIFFERENTIATED DUCT CARCINOMA, 1.3 CM              (MICROSCOPIC MEASUREMENT), ASSOCIATED WITH MICROCALCIFICATIONS.         -    EXTENSIVE DUCTAL CARCINOMA IN-SITU (DCIS), CRIBRIFORM,              PAPILLARY, AND MICROPAPILLARY TYPES ASSOCIATED WITH FOCAL              COMEDO NECROSIS AND CALCIFICATIONS, INTERMEDIATE NUCLEAR GRADE.         -    NO LYMPHOVASCULAR INVASION IS SEEN.         -    FOCAL LOBULAR CARCINOMA IN-SITU (LCIS), CLASSICAL TYPE, AND              ATYPICAL LOBULAR HYPERPLASIA (ALH).\par \par  -    LARGE DUCT SCLEROSING PAPILLOMA AND A SEPARATE RADIAL              SCLEROSING LESION (RADIAL SCAR).         -    ATROPHIC FATTY BREAST TISSUE WITH PROLIFERATIVE TYPE              FIBROCYSTIC CHANGES ASSOCIATED WITH MICROCALCIFICATIONS.         -    BENIGN SKIN WITH MULTIPLE HYPERKERATOTIC SEBORRHEIC KERATOSES,              NIPPLE, AND DEEP FASCIAL MARGIN INCLUDING SKELETAL MUSCLE.              NEGATIVE FOR CARCINOMA.         -    FIVE BENIGN INTRAMAMMARY LYMPH NODES (O/5), NEAGTIVE FOR              CARCINOMA SUPPORTED BY NEGATIVE IMMUNOHISTOCHEMICAL STAINS FOR              CYTOKERATINS (CK AE1/AE3 AND CK 8/18).         -    FOR HORMONE RECEPTOR AND ONCOPROTEIN EXPRESSION/GENE              AMPLIFICATION STATUS PLEASE SEE THE PATHOLOGY REPORT FROM THE              PRIOR NEEDLE CORE BIOPSY SPECIMEN ([id number]).          -    AJCC SEVENTH EDITION PATHOLOGIC STAGE: pT1c, pN0, pMx.\par  [de-identified] : 6/11/18\par Patient feels well today, has no complaints\par Has been taking the anastrozole since the last visit with no side effects \par Dexa scan was completed showing osteopenia in the radius \par \par 10/15/18:\par On Arimidex since 03/2018.\par Doing well. No major complaints.\par Denies fever, nausea, vomiting, chest pain, SOB, abdominal pain, bowel and bladder problems.\par DEXA in 03/2018 was normal.. \par \par 3/12/2019\par Patient presents for follow up\par Feels well with no complaints\par Sees Dr. Hutson regularly\par Up to date with flu shot, colonoscopy\par Denies fever, chills, nausea, vomiting, diarrhea\par \par 9/3/19\par Patient is here today for follow up visit. She offers no new complaints.  She is taking Anastrozole since 3/2018.  Last bone density was done 3/2018 normal.  She is taking calcium and vitamin D. Denies fever, chills, nausea, vomiting, diarrhea.\par \par 03/03/2020\par LYLA CLEMENTS a 84 year F is here today for follow up visit of breast cancer.\par Currently on Anastrazole 1 mg daily, tolerating well with vitamin d and calcium. \par Feels well, She denies any new complaints. \par No fever, chills, joint pain, hot flashes, any new palpable breast masses. \par Last DEXA 3/2018. \par \par \par

## 2020-03-03 NOTE — ASSESSMENT
[FreeTextEntry1] : In summary this is an  81yo F with Stage 1A ([id number]) , ER/TN +, HER2- Stage 1a Breast Cancer s/p left Mastectomy with SLN biopsy\par \par H/o right breast cancer s/p right mastectomy in 1990. \par \par Fungal rash to chest \par \par Plan:\par Continue Anastrozole. On Arimidex since 03/2018. Tolerating well. E-refilled today.\par The side effects from such treatment were discussed in detail including but not limited to hot flashes, weight gain, hair thinning, arthralgia, myalgia, bone loss, increased risk of osteoporotic fractures and thrombo-embolism.\par She will take Ca + VIt D daily while on AI.\par Her compliance and any side effects from such treatment were assessed at today's visit\par \par DEXA  due referral provided.  Continue Ca and Vit D supplementation\par \par Fungal rash to chest- e-prescribed Lotrimin cream to apply to area- if persist or worsen > 1 week f/up with dermatology \par \par RTC 6 months.\par \par Case was seen and discussed with Dr. Matson who agreed with the assessment and plan.\par \par

## 2020-03-10 DIAGNOSIS — Z79.811 LONG TERM (CURRENT) USE OF AROMATASE INHIBITORS: ICD-10-CM

## 2020-03-10 DIAGNOSIS — C50.412 MALIGNANT NEOPLASM OF UPPER-OUTER QUADRANT OF LEFT FEMALE BREAST: ICD-10-CM

## 2020-03-10 DIAGNOSIS — B36.9 SUPERFICIAL MYCOSIS, UNSPECIFIED: ICD-10-CM

## 2020-07-08 ENCOUNTER — APPOINTMENT (OUTPATIENT)
Dept: BREAST CENTER | Facility: CLINIC | Age: 85
End: 2020-07-08
Payer: MEDICARE

## 2020-07-08 VITALS
HEIGHT: 62 IN | SYSTOLIC BLOOD PRESSURE: 142 MMHG | DIASTOLIC BLOOD PRESSURE: 82 MMHG | BODY MASS INDEX: 32.76 KG/M2 | WEIGHT: 178 LBS | TEMPERATURE: 98.1 F

## 2020-07-08 PROCEDURE — 99213 OFFICE O/P EST LOW 20 MIN: CPT

## 2020-07-08 NOTE — ASSESSMENT
[FreeTextEntry1] : Ms. Moran is an 85F with a stage 1, [id number], ER/[place] positive (ashley 8/8) Her 2 neg left breast cancer, s/p L mastectomy with sentinel lymph node biopsy on 1/18/18. \par \par -no chemo indicated for L breast cancer\par -no RT indicated on L\par -on AI since 1/2018\par \par She is well healed from her left mastectomy and SLN Bx.  I do not palpate any nodularities in either chest wall.  She will be due for a repeat CBE in 1 year as she is over 2 years since her surgery/diagnosis.  She is tolerating the AI without any issues. \par \par As review:\par Ms. Moran underwent a left mastectomy with SLN Bx which yielded a 1.3 cm tumor with extensive DCIS and no evidence of metastasis to her sentinel lymph nodes.  This tumor was ER/CO positive and Her 2 negative. Her margins were negative. \par \par She will not need post mastectomy radiation therapy for a stage 1A disease with negative margins so a radiation oncology consult will not be made at this time. \par \par In regards to systemic therapy, an oncotype was sent on her tumor, however there was insufficient carcinoma on the slide submitted.  She was seen by medical oncology, and was recommended to undergo 5 years of an aromatase inhibitor, which she is tolerating well.  \par \par She is a candidate for genetic evaluation as she has had bilateral breast cancer.  I have offered to perform the testing today, however she is not interested in getting tested.  \par \par I will have her follow up in 12 months for a clinical breast exam.  \par All of her questions were answered. She knows to call with any further questions or concerns.

## 2020-07-08 NOTE — REVIEW OF SYSTEMS
[As Noted in HPI] : as noted in HPI [Negative] : Psychiatric [Fever] : no fever [Feeling Tired] : not feeling tired [Feeling Poorly] : not feeling poorly [Chills] : no chills [Recent Weight Loss (___ Lbs)] : no recent weight loss [Recent Weight Gain (___ Lbs)] : no recent weight gain [Arthralgias] : no arthralgias [Abn Vaginal Bleeding] : no unexplained vaginal bleeding [Joint Pain] : no joint pain [Skin Lesions] : no skin lesions [Breast Pain] : no breast pain [Skin Wound] : no skin wound [Hot Flashes] : no hot flashes [Breast Lump] : no breast lump

## 2020-07-08 NOTE — PAST MEDICAL HISTORY
[Menopause Age____] : age at menopause was [unfilled] [Menarche Age ____] : age at menarche was [unfilled] [Live Births ___] : P[unfilled]  [Age At Live Birth ___] : Age at live birth: [unfilled] [Total Preg ___] : G[unfilled] [FreeTextEntry5] : none [FreeTextEntry6] : none [FreeTextEntry7] : none [FreeTextEntry8] : none

## 2020-07-08 NOTE — HISTORY OF PRESENT ILLNESS
[FreeTextEntry1] : Ms. Moran is an 85F with a distant history of right breast cancer, s/p Right mastectomy and chemotherapy in 1990, now with a self palpated LEFT breast cancer, s/p Left mastectomy and sentinel lymph node biopsy on  1/18/18.  \par \par As review: \par -She underwent a right mastectomy and chemotherapy for right breast cancer in 1990.  \par -She has been disease for free until this year when she palpated a lesion in her inner quadrant of her left breast.  \par -diagnostic imaging revealed a 1.2 cm lesion at the 1-2:00 position, 11 cm from nipple.  --> This was biopsied and found to be an invasive carcinoma, intermediate nuclear grade, ER/VT + (Leia 8/8), Her 2 negative. \par -Additionally axillary US revealed 2 abnormal appearing lymph nodes in the left axilla. \par -She never had endocrine therapy or radiation therapy to either breast.  \par \par Initially she was motivated to undergo breast conservation surgery, so a bilateral breast MRI was performed to look for extent of disease.  Her results were as follows: \par \par 12/14/17 -- bilateral breast MRI\par -R breast: no regions of suspicious enhancement within the mastectomy bed or right chest wall \par -L breast: within UOQ, posterior 1/3 depth, irregular enhancing mass represents the biopsy proven IDC, measuring 1.1 x 0.8 x 0.6cm.  There is additionally nonmass enhancement extending approximately 1.0 cm posteriorly from the mass to within 0.6 cm of the of the pectoralis muscle.  There is no evidence of enhancement of the pectoralis muscle to indicate involvement.  There is a clumped nonmass enhancement extending approximately 6.4cm anteriorly from the index carcinoma which extends to within 2.1 cm of the nipple.  The nonmass enhancement spans 5.3 cm in the craniocaudal dimension. \par -multiple morphologically abnormal left axillary lymph nodes without demonstrable fatty hilum measuring up to 1.1 x 0.8 cm.   \par \par -After discussing the need for further biopsies in the left breast, she made the decision to proceed with a left breast mastectomy and sentinel lymph node biopsy. \par \par 1/18/18 -- L mastectomy with sentinel lymph node biopsy \par -[id number]\par -tumor size: 1.3 cm\par -margins negative\par -no LVI\par -extensive DCIS, intermediate nuclear grade; focal cLCIS and ALH\par -0/3 SLN positive for mets\par -ER/VT positive (Leia 8/8), Ki67 15, Her 2 negative  \par \par INTERVAL HISTORY: \par Kellen returns today for a 6 month follow up visit s/p L mastectomy with sentinel lymph node biopsy on 1/18/18.  She denies any pain at the surgical site.  She denies palpating any abnormal masses in either chest wall.  She denies any arm swelling or heaviness.  \par \par She is on arimidex since 1/2018 and is tolerating it well.  She denies any bony/muscle/joint pain, and is not having any hot flashes.

## 2020-07-08 NOTE — PHYSICAL EXAM
[Normocephalic] : normocephalic [Atraumatic] : atraumatic [EOMI] : extra ocular movement intact [No Supraclavicular Adenopathy] : no supraclavicular adenopathy [Examined in the supine and seated position] : examined in the supine and seated position [No Cervical Adenopathy] : no cervical adenopathy [No dominant masses] : no dominant masses in right breast  [No dominant masses] : no dominant masses left breast [No Axillary Lymphadenopathy] : no left axillary lymphadenopathy [Soft] : abdomen soft [Not Tender] : non-tender [No Edema] : no edema [No Ulceration] : no ulceration [No Rashes] : no rashes [de-identified] : bilateral surgical incisions are well healed, no discrete masses palpated in either breast.  She does have many skin moles on her chest wall -- exam unchanged

## 2020-09-08 ENCOUNTER — APPOINTMENT (OUTPATIENT)
Dept: HEMATOLOGY ONCOLOGY | Facility: CLINIC | Age: 85
End: 2020-09-08
Payer: MEDICARE

## 2020-09-08 ENCOUNTER — OUTPATIENT (OUTPATIENT)
Dept: OUTPATIENT SERVICES | Facility: HOSPITAL | Age: 85
LOS: 1 days | Discharge: HOME | End: 2020-09-08

## 2020-09-08 VITALS
HEIGHT: 62 IN | TEMPERATURE: 97 F | DIASTOLIC BLOOD PRESSURE: 69 MMHG | WEIGHT: 172 LBS | BODY MASS INDEX: 31.65 KG/M2 | HEART RATE: 95 BPM | SYSTOLIC BLOOD PRESSURE: 118 MMHG

## 2020-09-08 PROCEDURE — 99214 OFFICE O/P EST MOD 30 MIN: CPT

## 2020-09-08 RX ORDER — CLOTRIMAZOLE 10 MG/G
1 CREAM TOPICAL 3 TIMES DAILY
Qty: 1 | Refills: 0 | Status: DISCONTINUED | COMMUNITY
Start: 2020-03-03 | End: 2020-09-08

## 2020-09-08 RX ORDER — ATENOLOL 50 MG/1
TABLET ORAL
Refills: 0 | Status: DISCONTINUED | COMMUNITY
End: 2020-09-08

## 2020-09-08 RX ORDER — GLIMEPIRIDE 1 MG/1
1 TABLET ORAL
Refills: 0 | Status: DISCONTINUED | COMMUNITY
End: 2020-09-08

## 2020-09-08 RX ORDER — METFORMIN HYDROCHLORIDE 625 MG/1
TABLET ORAL
Refills: 0 | Status: DISCONTINUED | COMMUNITY
End: 2020-09-08

## 2020-09-11 NOTE — CONSULT LETTER
[Dear  ___] : Dear  [unfilled], [Please see my note below.] : Please see my note below. [( Thank you for referring [unfilled] for consultation for _____ )] : Thank you for referring [unfilled] for consultation for [unfilled] [Consult Closing:] : Thank you very much for allowing me to participate in the care of this patient.  If you have any questions, please do not hesitate to contact me. [Sincerely,] : Sincerely, [DrEsther  ___] : Dr. BURK [FreeTextEntry3] : Michael Matson MD

## 2020-09-11 NOTE — HISTORY OF PRESENT ILLNESS
[Disease: _____________________] : Disease: [unfilled] [T: ___] : T[unfilled] [N: ___] : N[unfilled] [AJCC Stage: ____] : AJCC Stage: [unfilled] [M: ___] : M[unfilled] [de-identified] : 82F with a distant history of right breast cancer, s/p Right mastectomy and chemotherapy in 1990, now with a self palpated LEFT breast cancer, s/p Left mastectomy and sentinel lymph node biopsy on 1/18/18. \par \par She underwent a right mastectomy and chemotherapy for right breast cancer in 1990 without any endocrine therapy or radiation therapy to either breast. She has been disease for free until this year when she palpated a lesion in her inner quadrant of her left breast. Diagnostic mammo revealed a 1.2 cm lesion at the 1-2:00 position, 11 cm from nipple which was biopsied and found to be an invasive carcinoma, intermediate nuclear grade, ER/NC + (Leia 8/8), Her 2 negative. An axillary US revealed 2 abnormal appearing lymph nodes in the left axilla. Bilateral breast MRI was performed to look for extent of disease which demonstrated \par The UOQ of the left breast, posterior 1/3 depth, irregular enhancing mass represents the biopsy proven IDC, measuring 1.1 x 0.8 x 0.6cm. There is additionally non mass enhancement extending approximately 1.0 cm posteriorly from the mass to within 0.6 cm of the of the pectoralis muscle. There is no evidence of enhancement of the pectoralis muscle to indicate involvement. There is a clumped non mass enhancement extending approximately 6.4cm anteriorly from the index carcinoma which extends to within 2.1 cm of the nipple. The non mass enhancement spans 5.3 cm in the craniocaudal dimension.  With multiple morphologically abnormal left axillary lymph nodes without demonstrable fatty hilum measuring up to 1.1 x 0.8 cm. Based on these findings the patient underwent a left breast mastectomy and sentinel lymph node biopsy. \par \par 1/18/18 L mastectomy with sentinel lymph node biopsy [id number] with a tumor size: 1.3 cm, margins negative, no LVI, extensive DCIS, intermediate nuclear grade; focal cLCIS and ALH, 0/3 SLN positive for mets, ER/NC positive (Leia 8/8), Ki67 15, Her 2 negative \par Patient feels well, has no complaints, denies any fevers, chills, shortness of breath, chest pain, N/V/D, abdominal pain, change in appetite or weight loss.  [de-identified] : 6/11/18\par Patient feels well today, has no complaints\par Has been taking the anastrozole since the last visit with no side effects \par Dexa scan was completed showing osteopenia in the radius \par \par 10/15/18:\par On Arimidex since 03/2018.\par Doing well. No major complaints.\par Denies fever, nausea, vomiting, chest pain, SOB, abdominal pain, bowel and bladder problems.\par DEXA in 03/2018 was normal.. \par \par 3/12/2019\par Patient presents for follow up\par Feels well with no complaints\par Sees Dr. Hutson regularly\par Up to date with flu shot, colonoscopy\par Denies fever, chills, nausea, vomiting, diarrhea\par \par 9/3/19\par Patient is here today for follow up visit. She offers no new complaints.  She is taking Anastrozole since 3/2018.  Last bone density was done 3/2018 normal.  She is taking calcium and vitamin D. Denies fever, chills, nausea, vomiting, diarrhea.\par \par 03/03/2020\par LYLA CLEMENTS a 84 year F is here today for follow up visit of breast cancer.\par Currently on Anastrazole 1 mg daily, tolerating well with vitamin d and calcium. \par Feels well, She denies any new complaints. \par No fever, chills, joint pain, hot flashes, any new palpable breast masses. \par Last DEXA 3/2018. \par \par 09/08/2020\par LYLA CLEMENTS a 85 year F is here today for follow up for breast cancer.\par Patient denies any new palpable breast lumps or pain, denies skin changes, denies nipple discharge.\par Currently on arimidex since 03/2018 with vitamin d and  calcium, walks regularly. \par \par  [de-identified] : IDC [de-identified] :    BREAST, LEFT 1-2 O'CLOCK MASS 1.2 CM, ULTRASOUND GUIDED NEEDLE CORE    BIOPSIES:          -    INVASIVE POORLY DIFFERENTIATED DUCT CARCINOMA AND DUCT              CARCINOMA IN-SITU (DCIS), SOLID AND COMEDO TYPES WITH FOCAL              EXTENSION TO INVOLVE AN INTRADUCTAL PAPILLOMA, INTERMEDIATE              NUCLEAR GRADE.          -    PENDING SPECIAL STUDIES FOR ER/NM PROTEINS, PROLIFERATION INDEX              (Ki-67), AND HER2/WADE ONCOPROTEIN EXPRESSION AND/OR GENE              AMPLIFICATION, WITH RESULTS TO BE REPORTED AS A SEPARATE\par \par      ADDENDUM REPORT    Immunohistochemical studies were performed at [facility], [facility], [address] [place] [address], [facility] (see NOTE).  The results are    as follows:                             % POSITIVE     STAINING INTENSITY  \par   ESTROGEN RECEPTOR        100            STRONG (3+)   \par  PROGESTERONE RECEPTOR    85             STRONG (3+)    \par Ki-67                    15\par \par * * FINAL DIAGNOSIS * *:    A)   BREAST, LEFT AXILLARY SENTINEL LYMPH NODE #1, BIOPSY:         -    ONE BENIGN LYMPH NODE (0/1).          -    NEGATIVE FOR CARCINOMA WITH EVALUATION OF MULTIPLE H&E LEVELS              AND WITH NEGATIVE IMMUNOHISTOCHEMICAL STAINS FOR CYTOKERATINS              (CK AE1/AE3 AND CK 8/18).    B)   BREAST, LEFT AXILLARY SENTINEL LYMPH NODE #2, BIOPSY:         -    TWO BENIGN LYMPH NODES (0/2).         -    NEGATIVE FOR CARCINOMA WITH EVALUATION OF MULTIPLE H&E LEVELS              AND WITH NEGATIVE IMMUNOHISTOCHEMICAL STAINS FOR CYTOKERATINS              (CK AE1/AE3 AND CK 8/18).    Comment: One of these two lymph nodes is largely fatty replaced and did    not appear on the originally reported frozen section slide preparations.    C)   BREAST, LEFT, SIMPLE MASTECTOMY:         -    HEALING PRIOR BIOPSY SITE IN THE UPPER OUTER QUADRANT WITH              INVASIVE MODERATELY DIFFERENTIATED DUCT CARCINOMA, 1.3 CM              (MICROSCOPIC MEASUREMENT), ASSOCIATED WITH MICROCALCIFICATIONS.         -    EXTENSIVE DUCTAL CARCINOMA IN-SITU (DCIS), CRIBRIFORM,              PAPILLARY, AND MICROPAPILLARY TYPES ASSOCIATED WITH FOCAL              COMEDO NECROSIS AND CALCIFICATIONS, INTERMEDIATE NUCLEAR GRADE.         -    NO LYMPHOVASCULAR INVASION IS SEEN.         -    FOCAL LOBULAR CARCINOMA IN-SITU (LCIS), CLASSICAL TYPE, AND              ATYPICAL LOBULAR HYPERPLASIA (ALH).\par \par  -    LARGE DUCT SCLEROSING PAPILLOMA AND A SEPARATE RADIAL              SCLEROSING LESION (RADIAL SCAR).         -    ATROPHIC FATTY BREAST TISSUE WITH PROLIFERATIVE TYPE              FIBROCYSTIC CHANGES ASSOCIATED WITH MICROCALCIFICATIONS.         -    BENIGN SKIN WITH MULTIPLE HYPERKERATOTIC SEBORRHEIC KERATOSES,              NIPPLE, AND DEEP FASCIAL MARGIN INCLUDING SKELETAL MUSCLE.              NEGATIVE FOR CARCINOMA.         -    FIVE BENIGN INTRAMAMMARY LYMPH NODES (O/5), NEAGTIVE FOR              CARCINOMA SUPPORTED BY NEGATIVE IMMUNOHISTOCHEMICAL STAINS FOR              CYTOKERATINS (CK AE1/AE3 AND CK 8/18).         -    FOR HORMONE RECEPTOR AND ONCOPROTEIN EXPRESSION/GENE              AMPLIFICATION STATUS PLEASE SEE THE PATHOLOGY REPORT FROM THE              PRIOR NEEDLE CORE BIOPSY SPECIMEN ([id number]).          -    AJCC SEVENTH EDITION PATHOLOGIC STAGE: pT1c, pN0, pMx.\par

## 2020-09-11 NOTE — ASSESSMENT
[FreeTextEntry1] : In summary this is an  81yo F with Stage 1A ([id number]) , ER/TX +, HER2- Stage 1a Breast Cancer s/p left Mastectomy with SLN biopsy\par \par H/o right breast cancer s/p right mastectomy in 1990. \par \par Plan:\par Continue Anastrozole. On Arimidex since 03/2018. Tolerating well. E-refilled today.\par The side effects from such treatment were discussed in detail including but not limited to hot flashes, weight gain, hair thinning, arthralgia, myalgia, bone loss, increased risk of osteoporotic fractures and thrombo-embolism.\par She will take Ca + VIt D daily while on AI.\par Her compliance and any side effects from such treatment were assessed at today's visit\par \par DEXA  due referral provided again overdue 3/2020.  Continue Ca and Vit D supplementation\par \par RTC 6 months.\par \par Case was seen and discussed with Dr. Matson who agreed with the assessment and plan.\par \par

## 2020-09-11 NOTE — PHYSICAL EXAM
[Restricted in physically strenuous activity but ambulatory and able to carry out work of a light or sedentary nature] : Status 1- Restricted in physically strenuous activity but ambulatory and able to carry out work of a light or sedentary nature, e.g., light house work, office work [Normal] : grossly intact [de-identified] : bilateral mastectomy sites, well healing no axillary lymphadenopathy palpated [de-identified] : Multiple skin tags

## 2020-09-14 DIAGNOSIS — C50.412 MALIGNANT NEOPLASM OF UPPER-OUTER QUADRANT OF LEFT FEMALE BREAST: ICD-10-CM

## 2020-09-14 DIAGNOSIS — Z79.811 LONG TERM (CURRENT) USE OF AROMATASE INHIBITORS: ICD-10-CM

## 2021-03-09 ENCOUNTER — APPOINTMENT (OUTPATIENT)
Dept: HEMATOLOGY ONCOLOGY | Facility: CLINIC | Age: 86
End: 2021-03-09

## 2021-07-06 ENCOUNTER — APPOINTMENT (OUTPATIENT)
Dept: BREAST CENTER | Facility: CLINIC | Age: 86
End: 2021-07-06
Payer: MEDICARE

## 2021-07-06 VITALS
HEIGHT: 62 IN | DIASTOLIC BLOOD PRESSURE: 80 MMHG | SYSTOLIC BLOOD PRESSURE: 138 MMHG | BODY MASS INDEX: 31.65 KG/M2 | WEIGHT: 172 LBS | TEMPERATURE: 97.4 F

## 2021-07-06 DIAGNOSIS — B36.9 SUPERFICIAL MYCOSIS, UNSPECIFIED: ICD-10-CM

## 2021-07-06 PROCEDURE — 99212 OFFICE O/P EST SF 10 MIN: CPT

## 2021-07-06 PROCEDURE — 99072 ADDL SUPL MATRL&STAF TM PHE: CPT

## 2021-07-06 RX ORDER — HYDROCORTISONE 25 MG/G
2.5 CREAM TOPICAL 3 TIMES DAILY
Qty: 1 | Refills: 1 | Status: ACTIVE | COMMUNITY
Start: 2021-07-06 | End: 1900-01-01

## 2021-07-06 NOTE — ASSESSMENT
[FreeTextEntry1] : Ms. Moran is an 86F with a stage 1, [id number], ER/NH positive (ashley 8/8) Her 2 neg left breast cancer, s/p L mastectomy with sentinel lymph node biopsy on 1/18/18. \par \par -no chemo indicated for L breast cancer\par -no RT indicated on L\par -on AI since 1/2018\par \par She is well healed from her left mastectomy and SLN Bx.  I do not palpate any nodularities in either chest wall.  She will be due for a repeat CBE in 1 year as she is over 2 years since her surgery/diagnosis.  She is tolerating the AI without any issues. \par \par I have prescribed her with a prescription for hydrocortisone cream to be used as needed. \par \par As review:\par Ms. Moran underwent a left mastectomy with SLN Bx which yielded a 1.3 cm tumor with extensive DCIS and no evidence of metastasis to her sentinel lymph nodes.  This tumor was ER/NH positive and Her 2 negative. Her margins were negative. \par \par She will not need post mastectomy radiation therapy for a stage 1A disease with negative margins so a radiation oncology consult will not be made at this time. \par \par In regards to systemic therapy, an oncotype was sent on her tumor, however there was insufficient carcinoma on the slide submitted.  She was seen by medical oncology, and was recommended to undergo 5 years of an aromatase inhibitor, which she is tolerating well.  \par \par She is a candidate for genetic evaluation as she has had bilateral breast cancer.  I have offered to perform the testing today, however she is not interested in getting tested.  \par \par I will have her follow up in 12 months for a clinical breast exam.  \par All of her questions were answered. She knows to call with any further questions or concerns.

## 2021-07-06 NOTE — HISTORY OF PRESENT ILLNESS
[FreeTextEntry1] : Ms. Moran is an 86F with a distant history of right breast cancer, s/p Right mastectomy and chemotherapy in 1990, now with a self palpated LEFT breast cancer, s/p Left mastectomy and sentinel lymph node biopsy on  1/18/18.  \par \par As review: \par -She underwent a right mastectomy and chemotherapy for right breast cancer in 1990.  \par -She has been disease for free until this year when she palpated a lesion in her inner quadrant of her left breast.  \par -diagnostic imaging revealed a 1.2 cm lesion at the 1-2:00 position, 11 cm from nipple.  --> This was biopsied and found to be an invasive carcinoma, intermediate nuclear grade, ER/MO + (Leia 8/8), Her 2 negative. \par -Additionally axillary US revealed 2 abnormal appearing lymph nodes in the left axilla. \par -She never had endocrine therapy or radiation therapy to either breast.  \par \par Initially she was motivated to undergo breast conservation surgery, so a bilateral breast MRI was performed to look for extent of disease.  Her results were as follows: \par \par 12/14/17 -- bilateral breast MRI\par -R breast: no regions of suspicious enhancement within the mastectomy bed or right chest wall \par -L breast: within UOQ, posterior 1/3 depth, irregular enhancing mass represents the biopsy proven IDC, measuring 1.1 x 0.8 x 0.6cm.  There is additionally nonmass enhancement extending approximately 1.0 cm posteriorly from the mass to within 0.6 cm of the of the pectoralis muscle.  There is no evidence of enhancement of the pectoralis muscle to indicate involvement.  There is a clumped nonmass enhancement extending approximately 6.4cm anteriorly from the index carcinoma which extends to within 2.1 cm of the nipple.  The nonmass enhancement spans 5.3 cm in the craniocaudal dimension. \par -multiple morphologically abnormal left axillary lymph nodes without demonstrable fatty hilum measuring up to 1.1 x 0.8 cm.   \par \par -After discussing the need for further biopsies in the left breast, she made the decision to proceed with a left breast mastectomy and sentinel lymph node biopsy. \par \par 1/18/18 -- L mastectomy with sentinel lymph node biopsy \par -[id number]\par -tumor size: 1.3 cm\par -margins negative\par -no LVI\par -extensive DCIS, intermediate nuclear grade; focal cLCIS and ALH\par -0/3 SLN positive for mets\par -ER/MO positive (Leia 8/8), Ki67 15, Her 2 negative  \par \par INTERVAL HISTORY: \par Kellen returns today for a 12 month follow up visit s/p L mastectomy with sentinel lymph node biopsy on 1/18/18.  She denies any pain at the surgical site.  She denies palpating any abnormal masses in either chest wall.  She denies any arm swelling or heaviness.  \par \par She is on arimidex since 1/2018 and is tolerating it well.  She denies any bony/muscle/joint pain, and is not having any hot flashes.

## 2021-07-06 NOTE — PHYSICAL EXAM
[Normocephalic] : normocephalic [Atraumatic] : atraumatic [EOMI] : extra ocular movement intact [No Supraclavicular Adenopathy] : no supraclavicular adenopathy [No Cervical Adenopathy] : no cervical adenopathy [Examined in the supine and seated position] : examined in the supine and seated position [No dominant masses] : no dominant masses in right breast  [No dominant masses] : no dominant masses left breast [No Axillary Lymphadenopathy] : no left axillary lymphadenopathy [Soft] : abdomen soft [Not Tender] : non-tender [No Edema] : no edema [No Rashes] : no rashes [No Ulceration] : no ulceration [de-identified] : bilateral surgical incisions are well healed, no discrete masses palpated in either breast.  She does have many skin moles on her chest wall -- exam unchanged

## 2021-07-06 NOTE — PAST MEDICAL HISTORY
[Menarche Age ____] : age at menarche was [unfilled] [Menopause Age____] : age at menopause was [unfilled] [Total Preg ___] : G[unfilled] [Live Births ___] : P[unfilled]  [Age At Live Birth ___] : Age at live birth: [unfilled] [FreeTextEntry5] : none [FreeTextEntry6] : none [FreeTextEntry7] : none [FreeTextEntry8] : none

## 2021-08-02 ENCOUNTER — APPOINTMENT (OUTPATIENT)
Dept: HEMATOLOGY ONCOLOGY | Facility: CLINIC | Age: 86
End: 2021-08-02
Payer: MEDICARE

## 2021-08-02 ENCOUNTER — OUTPATIENT (OUTPATIENT)
Dept: OUTPATIENT SERVICES | Facility: HOSPITAL | Age: 86
LOS: 1 days | Discharge: HOME | End: 2021-08-02

## 2021-08-02 VITALS
DIASTOLIC BLOOD PRESSURE: 73 MMHG | HEART RATE: 87 BPM | HEIGHT: 62 IN | SYSTOLIC BLOOD PRESSURE: 140 MMHG | TEMPERATURE: 97.2 F | BODY MASS INDEX: 31.83 KG/M2 | WEIGHT: 173 LBS

## 2021-08-02 DIAGNOSIS — Z79.811 LONG TERM (CURRENT) USE OF AROMATASE INHIBITORS: ICD-10-CM

## 2021-08-02 PROCEDURE — 99214 OFFICE O/P EST MOD 30 MIN: CPT

## 2021-08-02 RX ORDER — ANASTROZOLE TABLETS 1 MG/1
1 TABLET ORAL
Qty: 90 | Refills: 1 | Status: ACTIVE | COMMUNITY
Start: 2018-03-12 | End: 1900-01-01

## 2021-08-02 NOTE — PHYSICAL EXAM
[Restricted in physically strenuous activity but ambulatory and able to carry out work of a light or sedentary nature] : Status 1- Restricted in physically strenuous activity but ambulatory and able to carry out work of a light or sedentary nature, e.g., light house work, office work [Normal] : grossly intact [de-identified] : bilateral mastectomy , no axillary lymphadenopathy palpated [de-identified] : Multiple skin tags

## 2021-08-02 NOTE — HISTORY OF PRESENT ILLNESS
[Disease: _____________________] : Disease: [unfilled] [T: ___] : T[unfilled] [N: ___] : N[unfilled] [M: ___] : M[unfilled] [AJCC Stage: ____] : AJCC Stage: [unfilled] [de-identified] : 86 F with a distant history of right breast cancer, s/p Right mastectomy and chemotherapy in 1990, now with a self palpated LEFT breast cancer, s/p Left mastectomy and sentinel lymph node biopsy on 1/18/18. \par \par She underwent a right mastectomy and chemotherapy for right breast cancer in 1990 without any endocrine therapy or radiation therapy to either breast. She has been disease for free until this year when she palpated a lesion in her inner quadrant of her left breast. Diagnostic mammo revealed a 1.2 cm lesion at the 1-2:00 position, 11 cm from nipple which was biopsied and found to be an invasive carcinoma, intermediate nuclear grade, ER/NM + (Leia 8/8), Her 2 negative. An axillary US revealed 2 abnormal appearing lymph nodes in the left axilla. Bilateral breast MRI was performed to look for extent of disease which demonstrated \par The UOQ of the left breast, posterior 1/3 depth, irregular enhancing mass represents the biopsy proven IDC, measuring 1.1 x 0.8 x 0.6cm. There is additionally non mass enhancement extending approximately 1.0 cm posteriorly from the mass to within 0.6 cm of the of the pectoralis muscle. There is no evidence of enhancement of the pectoralis muscle to indicate involvement. There is a clumped non mass enhancement extending approximately 6.4cm anteriorly from the index carcinoma which extends to within 2.1 cm of the nipple. The non mass enhancement spans 5.3 cm in the craniocaudal dimension.  With multiple morphologically abnormal left axillary lymph nodes without demonstrable fatty hilum measuring up to 1.1 x 0.8 cm. Based on these findings the patient underwent a left breast mastectomy and sentinel lymph node biopsy. \par \par 1/18/18 L mastectomy with sentinel lymph node biopsy [id number] with a tumor size: 1.3 cm, margins negative, no LVI, extensive DCIS, intermediate nuclear grade; focal cLCIS and ALH, 0/3 SLN positive for mets, ER/NM positive (Leia 8/8), Ki67 15, Her 2 negative \par Patient feels well, has no complaints, denies any fevers, chills, shortness of breath, chest pain, N/V/D, abdominal pain, change in appetite or weight loss.  [de-identified] : IDC [de-identified] :    BREAST, LEFT 1-2 O'CLOCK MASS 1.2 CM, ULTRASOUND GUIDED NEEDLE CORE    BIOPSIES:          -    INVASIVE POORLY DIFFERENTIATED DUCT CARCINOMA AND DUCT              CARCINOMA IN-SITU (DCIS), SOLID AND COMEDO TYPES WITH FOCAL              EXTENSION TO INVOLVE AN INTRADUCTAL PAPILLOMA, INTERMEDIATE              NUCLEAR GRADE.          -    PENDING SPECIAL STUDIES FOR ER/[place] PROTEINS, PROLIFERATION INDEX              (Ki-67), AND HER2/WADE ONCOPROTEIN EXPRESSION AND/OR GENE              AMPLIFICATION, WITH RESULTS TO BE REPORTED AS A SEPARATE\par \par      ADDENDUM REPORT    Immunohistochemical studies were performed at [facility], [facility], [address] [place] [address], [facility] (see NOTE).  The results are    as follows:                             % POSITIVE     STAINING INTENSITY  \par   ESTROGEN RECEPTOR        100            STRONG (3+)   \par  PROGESTERONE RECEPTOR    85             STRONG (3+)    \par Ki-67                    15\par \par * * FINAL DIAGNOSIS * *:    A)   BREAST, LEFT AXILLARY SENTINEL LYMPH NODE #1, BIOPSY:         -    ONE BENIGN LYMPH NODE (0/1).          -    NEGATIVE FOR CARCINOMA WITH EVALUATION OF MULTIPLE H&E LEVELS              AND WITH NEGATIVE IMMUNOHISTOCHEMICAL STAINS FOR CYTOKERATINS              (CK AE1/AE3 AND CK 8/18).    B)   BREAST, LEFT AXILLARY SENTINEL LYMPH NODE #2, BIOPSY:         -    TWO BENIGN LYMPH NODES (0/2).         -    NEGATIVE FOR CARCINOMA WITH EVALUATION OF MULTIPLE H&E LEVELS              AND WITH NEGATIVE IMMUNOHISTOCHEMICAL STAINS FOR CYTOKERATINS              (CK AE1/AE3 AND CK 8/18).    Comment: One of these two lymph nodes is largely fatty replaced and did    not appear on the originally reported frozen section slide preparations.    C)   BREAST, LEFT, SIMPLE MASTECTOMY:         -    HEALING PRIOR BIOPSY SITE IN THE UPPER OUTER QUADRANT WITH              INVASIVE MODERATELY DIFFERENTIATED DUCT CARCINOMA, 1.3 CM              (MICROSCOPIC MEASUREMENT), ASSOCIATED WITH MICROCALCIFICATIONS.         -    EXTENSIVE DUCTAL CARCINOMA IN-SITU (DCIS), CRIBRIFORM,              PAPILLARY, AND MICROPAPILLARY TYPES ASSOCIATED WITH FOCAL              COMEDO NECROSIS AND CALCIFICATIONS, INTERMEDIATE NUCLEAR GRADE.         -    NO LYMPHOVASCULAR INVASION IS SEEN.         -    FOCAL LOBULAR CARCINOMA IN-SITU (LCIS), CLASSICAL TYPE, AND              ATYPICAL LOBULAR HYPERPLASIA (ALH).\par \par  -    LARGE DUCT SCLEROSING PAPILLOMA AND A SEPARATE RADIAL              SCLEROSING LESION (RADIAL SCAR).         -    ATROPHIC FATTY BREAST TISSUE WITH PROLIFERATIVE TYPE              FIBROCYSTIC CHANGES ASSOCIATED WITH MICROCALCIFICATIONS.         -    BENIGN SKIN WITH MULTIPLE HYPERKERATOTIC SEBORRHEIC KERATOSES,              NIPPLE, AND DEEP FASCIAL MARGIN INCLUDING SKELETAL MUSCLE.              NEGATIVE FOR CARCINOMA.         -    FIVE BENIGN INTRAMAMMARY LYMPH NODES (O/5), NEAGTIVE FOR              CARCINOMA SUPPORTED BY NEGATIVE IMMUNOHISTOCHEMICAL STAINS FOR              CYTOKERATINS (CK AE1/AE3 AND CK 8/18).         -    FOR HORMONE RECEPTOR AND ONCOPROTEIN EXPRESSION/GENE              AMPLIFICATION STATUS PLEASE SEE THE PATHOLOGY REPORT FROM THE              PRIOR NEEDLE CORE BIOPSY SPECIMEN ([id number]).          -    AJCC SEVENTH EDITION PATHOLOGIC STAGE: pT1c, pN0, pMx.\par  [de-identified] : 6/11/18\par Patient feels well today, has no complaints\par Has been taking the anastrozole since the last visit with no side effects \par Dexa scan was completed showing osteopenia in the radius \par \par 10/15/18:\par On Arimidex since 03/2018.\par Doing well. No major complaints.\par Denies fever, nausea, vomiting, chest pain, SOB, abdominal pain, bowel and bladder problems.\par DEXA in 03/2018 was normal.. \par \par 3/12/2019\par Patient presents for follow up\par Feels well with no complaints\par Sees Dr. Hutson regularly\par Up to date with flu shot, colonoscopy\par Denies fever, chills, nausea, vomiting, diarrhea\par \par 9/3/19\par Patient is here today for follow up visit. She offers no new complaints.  She is taking Anastrozole since 3/2018.  Last bone density was done 3/2018 normal.  She is taking calcium and vitamin D. Denies fever, chills, nausea, vomiting, diarrhea.\par \par 03/03/2020\par LYLA CLEMENTS a 84 year F is here today for follow up visit of breast cancer.\par Currently on Anastrazole 1 mg daily, tolerating well with vitamin d and calcium. \par Feels well, She denies any new complaints. \par No fever, chills, joint pain, hot flashes, any new palpable breast masses. \par Last DEXA 3/2018. \par \par 09/08/2020\par LYLA CLEMENTS a 85 year F is here today for follow up for breast cancer.\par Patient denies any new palpable breast lumps or pain, denies skin changes, denies nipple discharge.\par Currently on arimidex since 03/2018 with vitamin d and  calcium, walks regularly. \par \par 8/2/21\par  Patient here for follow up, feeling well.  She denies any new complaints.  Patient denies any new palpable breast lumps or pain, denies skin changes, denies nipple discharge.  Patient denies cough, shortness of breath, denies fever, denies bone pain.\par taking Arimidex since 3/18,\par DId not go for DEXa last year.\par Takes Ca + D\par UTD with colonoscopy and GYN\par \par \par

## 2021-08-02 NOTE — ASSESSMENT
[FreeTextEntry1] : In summary this is an  85yo F with Stage 1A ([id number]) , ER/CO +, HER2- Stage 1a Breast Cancer s/p left Mastectomy with SLN biopsy\par \par H/o right breast cancer s/p right mastectomy in 1990. \par \par Plan:\par Continue Anastrozole. On Arimidex since 03/2018. Tolerating well. \par The side effects from such treatment were discussed in detail including but not limited to hot flashes, weight gain, hair thinning, arthralgia, myalgia, bone loss, increased risk of osteoporotic fractures and thrombo-embolism.\par She will take Ca + VIt D daily while on AI.\par Her compliance and any side effects from such treatment were assessed at today's visit\par \par DEXA  due referral provided again overdue since 3/2020.  Continue Ca and Vit D supplementation\par \par RTC 6 months.\par \par \par

## 2021-08-03 DIAGNOSIS — Z79.811 LONG TERM (CURRENT) USE OF AROMATASE INHIBITORS: ICD-10-CM

## 2021-08-03 DIAGNOSIS — C50.412 MALIGNANT NEOPLASM OF UPPER-OUTER QUADRANT OF LEFT FEMALE BREAST: ICD-10-CM

## 2021-08-24 ENCOUNTER — OUTPATIENT (OUTPATIENT)
Dept: OUTPATIENT SERVICES | Facility: HOSPITAL | Age: 86
LOS: 1 days | Discharge: HOME | End: 2021-08-24

## 2021-08-24 ENCOUNTER — RESULT REVIEW (OUTPATIENT)
Age: 86
End: 2021-08-24

## 2021-08-30 DIAGNOSIS — M89.9 DISORDER OF BONE, UNSPECIFIED: ICD-10-CM

## 2021-08-30 DIAGNOSIS — Z78.0 ASYMPTOMATIC MENOPAUSAL STATE: ICD-10-CM

## 2021-08-30 DIAGNOSIS — Z13.820 ENCOUNTER FOR SCREENING FOR OSTEOPOROSIS: ICD-10-CM

## 2022-02-01 ENCOUNTER — APPOINTMENT (OUTPATIENT)
Dept: HEMATOLOGY ONCOLOGY | Facility: CLINIC | Age: 87
End: 2022-02-01

## 2022-07-05 ENCOUNTER — APPOINTMENT (OUTPATIENT)
Dept: BREAST CENTER | Facility: CLINIC | Age: 87
End: 2022-07-05

## 2022-07-05 VITALS
SYSTOLIC BLOOD PRESSURE: 141 MMHG | WEIGHT: 180 LBS | HEIGHT: 62 IN | DIASTOLIC BLOOD PRESSURE: 80 MMHG | BODY MASS INDEX: 33.13 KG/M2

## 2022-07-05 DIAGNOSIS — C50.412 MALIGNANT NEOPLASM OF UPPER-OUTER QUADRANT OF LEFT FEMALE BREAST: ICD-10-CM

## 2022-07-05 DIAGNOSIS — Z17.0 MALIGNANT NEOPLASM OF UPPER-OUTER QUADRANT OF LEFT FEMALE BREAST: ICD-10-CM

## 2022-07-05 PROCEDURE — 99212 OFFICE O/P EST SF 10 MIN: CPT

## 2022-07-05 NOTE — PHYSICAL EXAM
[Normocephalic] : normocephalic [Atraumatic] : atraumatic [EOMI] : extra ocular movement intact [No Supraclavicular Adenopathy] : no supraclavicular adenopathy [No Cervical Adenopathy] : no cervical adenopathy [Examined in the supine and seated position] : examined in the supine and seated position [No dominant masses] : no dominant masses in right breast  [No dominant masses] : no dominant masses left breast [No Axillary Lymphadenopathy] : no left axillary lymphadenopathy [Soft] : abdomen soft [Not Tender] : non-tender [No Edema] : no edema [No Rashes] : no rashes [No Ulceration] : no ulceration [de-identified] : bilateral surgical incisions are well healed, no discrete masses palpated in either breast.  She does have many skin moles on her chest wall -- exam unchanged

## 2022-07-05 NOTE — HISTORY OF PRESENT ILLNESS
[FreeTextEntry1] : Ms. Clements is an 86F with a distant history of right breast cancer, s/p Right mastectomy and chemotherapy in 1990, now with a self palpated LEFT breast cancer, s/p Left mastectomy and sentinel lymph node biopsy on  1/18/18.  \par \par As review: \par -She underwent a right mastectomy and chemotherapy for right breast cancer in 1990.  \par -She has been disease for free until this year when she palpated a lesion in her inner quadrant of her left breast.  \par -diagnostic imaging revealed a 1.2 cm lesion at the 1-2:00 position, 11 cm from nipple.  --> This was biopsied and found to be an invasive carcinoma, intermediate nuclear grade, ER/NV + (Leia 8/8), Her 2 negative. \par -Additionally axillary US revealed 2 abnormal appearing lymph nodes in the left axilla. \par -She never had endocrine therapy or radiation therapy to either breast.  \par \par Initially she was motivated to undergo breast conservation surgery, so a bilateral breast MRI was performed to look for extent of disease.  Her results were as follows: \par \par 12/14/17 -- bilateral breast MRI\par -R breast: no regions of suspicious enhancement within the mastectomy bed or right chest wall \par -L breast: within UOQ, posterior 1/3 depth, irregular enhancing mass represents the biopsy proven IDC, measuring 1.1 x 0.8 x 0.6cm.  There is additionally nonmass enhancement extending approximately 1.0 cm posteriorly from the mass to within 0.6 cm of the of the pectoralis muscle.  There is no evidence of enhancement of the pectoralis muscle to indicate involvement.  There is a clumped nonmass enhancement extending approximately 6.4cm anteriorly from the index carcinoma which extends to within 2.1 cm of the nipple.  The nonmass enhancement spans 5.3 cm in the craniocaudal dimension. \par -multiple morphologically abnormal left axillary lymph nodes without demonstrable fatty hilum measuring up to 1.1 x 0.8 cm.   \par \par -After discussing the need for further biopsies in the left breast, she made the decision to proceed with a left breast mastectomy and sentinel lymph node biopsy. \par \par 1/18/18 -- L mastectomy with sentinel lymph node biopsy \par -[id number]\par -tumor size: 1.3 cm\par -margins negative\par -no LVI\par -extensive DCIS, intermediate nuclear grade; focal cLCIS and ALH\par -0/3 SLN positive for mets\par -ER/NV positive (Leia 8/8), Ki67 15, Her 2 negative  \par \par INTERVAL HISTORY: \lisa Foreman returns today for a 12 month follow up visit s/p L mastectomy with sentinel lymph node biopsy on 1/18/18.  She denies any pain at the surgical site.  She denies palpating any abnormal masses in either chest wall.  She denies any arm swelling or heaviness.  \par \par She is on arimidex since 1/2018 and is tolerating it well.  She denies any bony/muscle/joint pain, and is not having any hot flashes. \par \par 07/05/2022 --\par LYLA CLEMENTS is a 87 year old female patient who presents today in follow up.  She is s/p L mastectomy with sentinel lymph node biopsy on 1/18/18.  \par Since her last visit, she has no new breast related complaints.\par \par She presents today for evaluation.

## 2022-07-05 NOTE — REVIEW OF SYSTEMS
[As Noted in HPI] : as noted in HPI [Negative] : Psychiatric [Fever] : no fever [Chills] : no chills [Feeling Poorly] : not feeling poorly [Feeling Tired] : not feeling tired [Recent Weight Gain (___ Lbs)] : no recent weight gain [Recent Weight Loss (___ Lbs)] : no recent weight loss [Abn Vaginal Bleeding] : no unexplained vaginal bleeding [Arthralgias] : no arthralgias [Skin Lesions] : no skin lesions [Skin Wound] : no skin wound [Breast Pain] : no breast pain [Breast Lump] : no breast lump [Hot Flashes] : no hot flashes

## 2022-07-05 NOTE — ASSESSMENT
[FreeTextEntry1] : Ms. Moran is an 87F with a stage 1, [id number], ER/VA positive (ashley 8/8) Her 2 neg left breast cancer, s/p L mastectomy with sentinel lymph node biopsy on 1/18/18. \par \par -no chemo indicated for L breast cancer\par -no RT indicated on L\par -on AI since 1/2018\par \par She is well healed from her left mastectomy and SLN Bx.  I do not palpate any nodularities in either chest wall.  She will be due for a repeat CBE in 1 year as she is over 2 years since her surgery/diagnosis.  She is tolerating the AI without any issues. \par \par I have prescribed her with a prescription for hydrocortisone cream to be used as needed. \par \par As review:\par Ms. Moran underwent a left mastectomy with SLN Bx which yielded a 1.3 cm tumor with extensive DCIS and no evidence of metastasis to her sentinel lymph nodes.  This tumor was ER/VA positive and Her 2 negative. Her margins were negative. \par \par She will not need post mastectomy radiation therapy for a stage 1A disease with negative margins so a radiation oncology consult will not be made at this time. \par \par In regards to systemic therapy, an oncotype was sent on her tumor, however there was insufficient carcinoma on the slide submitted.  She was seen by medical oncology, and was recommended to undergo 5 years of an aromatase inhibitor, which she is tolerating well.  \par \par She is a candidate for genetic evaluation as she has had bilateral breast cancer.  I have offered to perform the testing today, however she is not interested in getting tested.  \par \par I will have her follow up in 12 months for a clinical breast exam.  \par All of her questions were answered. She knows to call with any further questions or concerns.

## 2023-07-10 ENCOUNTER — APPOINTMENT (OUTPATIENT)
Dept: BREAST CENTER | Facility: CLINIC | Age: 88
End: 2023-07-10